# Patient Record
Sex: FEMALE | Race: WHITE | NOT HISPANIC OR LATINO | Employment: PART TIME | URBAN - METROPOLITAN AREA
[De-identification: names, ages, dates, MRNs, and addresses within clinical notes are randomized per-mention and may not be internally consistent; named-entity substitution may affect disease eponyms.]

---

## 2021-05-27 ENCOUNTER — RECORDS - HEALTHEAST (OUTPATIENT)
Dept: ADMINISTRATIVE | Facility: CLINIC | Age: 36
End: 2021-05-27

## 2024-04-07 ENCOUNTER — APPOINTMENT (OUTPATIENT)
Dept: CT IMAGING | Facility: CLINIC | Age: 39
End: 2024-04-07
Attending: STUDENT IN AN ORGANIZED HEALTH CARE EDUCATION/TRAINING PROGRAM
Payer: COMMERCIAL

## 2024-04-07 ENCOUNTER — HOSPITAL ENCOUNTER (EMERGENCY)
Facility: CLINIC | Age: 39
Discharge: HOME | End: 2024-04-07
Attending: STUDENT IN AN ORGANIZED HEALTH CARE EDUCATION/TRAINING PROGRAM | Admitting: STUDENT IN AN ORGANIZED HEALTH CARE EDUCATION/TRAINING PROGRAM
Payer: COMMERCIAL

## 2024-04-07 VITALS
SYSTOLIC BLOOD PRESSURE: 147 MMHG | TEMPERATURE: 98.8 F | HEART RATE: 94 BPM | OXYGEN SATURATION: 98 % | RESPIRATION RATE: 20 BRPM | DIASTOLIC BLOOD PRESSURE: 102 MMHG

## 2024-04-07 DIAGNOSIS — J01.20 ACUTE ETHMOIDAL SINUSITIS, RECURRENCE NOT SPECIFIED: ICD-10-CM

## 2024-04-07 LAB
ALBUMIN SERPL BCG-MCNC: 4 G/DL (ref 3.5–5.2)
ALP SERPL-CCNC: 79 U/L (ref 40–150)
ALT SERPL W P-5'-P-CCNC: 15 U/L (ref 0–50)
ANION GAP SERPL CALCULATED.3IONS-SCNC: 10 MMOL/L (ref 7–15)
AST SERPL W P-5'-P-CCNC: 18 U/L (ref 0–45)
BASOPHILS # BLD AUTO: 0 10E3/UL (ref 0–0.2)
BASOPHILS NFR BLD AUTO: 0 %
BILIRUB SERPL-MCNC: 0.2 MG/DL
BUN SERPL-MCNC: 9.8 MG/DL (ref 6–20)
CALCIUM SERPL-MCNC: 8.7 MG/DL (ref 8.6–10)
CHLORIDE SERPL-SCNC: 103 MMOL/L (ref 98–107)
CREAT SERPL-MCNC: 0.7 MG/DL (ref 0.51–0.95)
CRP SERPL-MCNC: 3.49 MG/L
DEPRECATED HCO3 PLAS-SCNC: 24 MMOL/L (ref 22–29)
EGFRCR SERPLBLD CKD-EPI 2021: >90 ML/MIN/1.73M2
EOSINOPHIL # BLD AUTO: 0.2 10E3/UL (ref 0–0.7)
EOSINOPHIL NFR BLD AUTO: 2 %
ERYTHROCYTE [DISTWIDTH] IN BLOOD BY AUTOMATED COUNT: 12.4 % (ref 10–15)
ERYTHROCYTE [SEDIMENTATION RATE] IN BLOOD BY WESTERGREN METHOD: 16 MM/HR (ref 0–20)
GLUCOSE SERPL-MCNC: 89 MG/DL (ref 70–99)
HCT VFR BLD AUTO: 40 % (ref 35–47)
HGB BLD-MCNC: 13.6 G/DL (ref 11.7–15.7)
HOLD SPECIMEN: NORMAL
IMM GRANULOCYTES # BLD: 0 10E3/UL
IMM GRANULOCYTES NFR BLD: 0 %
LYMPHOCYTES # BLD AUTO: 3.3 10E3/UL (ref 0.8–5.3)
LYMPHOCYTES NFR BLD AUTO: 34 %
MCH RBC QN AUTO: 29.2 PG (ref 26.5–33)
MCHC RBC AUTO-ENTMCNC: 34 G/DL (ref 31.5–36.5)
MCV RBC AUTO: 86 FL (ref 78–100)
MONOCYTES # BLD AUTO: 0.7 10E3/UL (ref 0–1.3)
MONOCYTES NFR BLD AUTO: 7 %
NEUTROPHILS # BLD AUTO: 5.6 10E3/UL (ref 1.6–8.3)
NEUTROPHILS NFR BLD AUTO: 57 %
NRBC # BLD AUTO: 0 10E3/UL
NRBC BLD AUTO-RTO: 0 /100
PLATELET # BLD AUTO: 240 10E3/UL (ref 150–450)
POTASSIUM SERPL-SCNC: 3.7 MMOL/L (ref 3.4–5.3)
PROT SERPL-MCNC: 7.3 G/DL (ref 6.4–8.3)
RBC # BLD AUTO: 4.65 10E6/UL (ref 3.8–5.2)
SODIUM SERPL-SCNC: 137 MMOL/L (ref 135–145)
WBC # BLD AUTO: 9.9 10E3/UL (ref 4–11)

## 2024-04-07 PROCEDURE — 36415 COLL VENOUS BLD VENIPUNCTURE: CPT | Performed by: STUDENT IN AN ORGANIZED HEALTH CARE EDUCATION/TRAINING PROGRAM

## 2024-04-07 PROCEDURE — 250N000011 HC RX IP 250 OP 636: Performed by: STUDENT IN AN ORGANIZED HEALTH CARE EDUCATION/TRAINING PROGRAM

## 2024-04-07 PROCEDURE — 70450 CT HEAD/BRAIN W/O DYE: CPT

## 2024-04-07 PROCEDURE — 85025 COMPLETE CBC W/AUTO DIFF WBC: CPT | Performed by: STUDENT IN AN ORGANIZED HEALTH CARE EDUCATION/TRAINING PROGRAM

## 2024-04-07 PROCEDURE — 99285 EMERGENCY DEPT VISIT HI MDM: CPT | Mod: 25

## 2024-04-07 PROCEDURE — 250N000009 HC RX 250: Performed by: STUDENT IN AN ORGANIZED HEALTH CARE EDUCATION/TRAINING PROGRAM

## 2024-04-07 PROCEDURE — 96361 HYDRATE IV INFUSION ADD-ON: CPT

## 2024-04-07 PROCEDURE — 258N000003 HC RX IP 258 OP 636: Performed by: STUDENT IN AN ORGANIZED HEALTH CARE EDUCATION/TRAINING PROGRAM

## 2024-04-07 PROCEDURE — 80053 COMPREHEN METABOLIC PANEL: CPT | Performed by: STUDENT IN AN ORGANIZED HEALTH CARE EDUCATION/TRAINING PROGRAM

## 2024-04-07 PROCEDURE — 99284 EMERGENCY DEPT VISIT MOD MDM: CPT | Performed by: STUDENT IN AN ORGANIZED HEALTH CARE EDUCATION/TRAINING PROGRAM

## 2024-04-07 PROCEDURE — 70481 CT ORBIT/EAR/FOSSA W/DYE: CPT | Mod: XU

## 2024-04-07 PROCEDURE — 86140 C-REACTIVE PROTEIN: CPT | Performed by: STUDENT IN AN ORGANIZED HEALTH CARE EDUCATION/TRAINING PROGRAM

## 2024-04-07 PROCEDURE — 85652 RBC SED RATE AUTOMATED: CPT | Performed by: STUDENT IN AN ORGANIZED HEALTH CARE EDUCATION/TRAINING PROGRAM

## 2024-04-07 PROCEDURE — 96374 THER/PROPH/DIAG INJ IV PUSH: CPT | Mod: 59

## 2024-04-07 PROCEDURE — 96375 TX/PRO/DX INJ NEW DRUG ADDON: CPT

## 2024-04-07 RX ORDER — DIPHENHYDRAMINE HYDROCHLORIDE 50 MG/ML
25 INJECTION INTRAMUSCULAR; INTRAVENOUS ONCE
Status: COMPLETED | OUTPATIENT
Start: 2024-04-07 | End: 2024-04-07

## 2024-04-07 RX ORDER — KETOROLAC TROMETHAMINE 15 MG/ML
15 INJECTION, SOLUTION INTRAMUSCULAR; INTRAVENOUS ONCE
Status: COMPLETED | OUTPATIENT
Start: 2024-04-07 | End: 2024-04-07

## 2024-04-07 RX ORDER — IOPAMIDOL 755 MG/ML
67 INJECTION, SOLUTION INTRAVASCULAR ONCE
Status: COMPLETED | OUTPATIENT
Start: 2024-04-07 | End: 2024-04-07

## 2024-04-07 RX ADMIN — IOPAMIDOL 67 ML: 755 INJECTION, SOLUTION INTRAVENOUS at 20:16

## 2024-04-07 RX ADMIN — SODIUM CHLORIDE 80 ML: 9 INJECTION, SOLUTION INTRAVENOUS at 20:15

## 2024-04-07 RX ADMIN — DIPHENHYDRAMINE HYDROCHLORIDE 25 MG: 50 INJECTION, SOLUTION INTRAMUSCULAR; INTRAVENOUS at 19:42

## 2024-04-07 RX ADMIN — SODIUM CHLORIDE 1000 ML: 9 INJECTION, SOLUTION INTRAVENOUS at 19:36

## 2024-04-07 RX ADMIN — KETOROLAC TROMETHAMINE 15 MG: 15 INJECTION, SOLUTION INTRAMUSCULAR; INTRAVENOUS at 19:36

## 2024-04-07 RX ADMIN — PROCHLORPERAZINE EDISYLATE 10 MG: 5 INJECTION INTRAMUSCULAR; INTRAVENOUS at 19:44

## 2024-04-07 ASSESSMENT — COLUMBIA-SUICIDE SEVERITY RATING SCALE - C-SSRS
1. IN THE PAST MONTH, HAVE YOU WISHED YOU WERE DEAD OR WISHED YOU COULD GO TO SLEEP AND NOT WAKE UP?: NO
6. HAVE YOU EVER DONE ANYTHING, STARTED TO DO ANYTHING, OR PREPARED TO DO ANYTHING TO END YOUR LIFE?: NO
2. HAVE YOU ACTUALLY HAD ANY THOUGHTS OF KILLING YOURSELF IN THE PAST MONTH?: NO

## 2024-04-07 ASSESSMENT — ACTIVITIES OF DAILY LIVING (ADL)
ADLS_ACUITY_SCORE: 35
ADLS_ACUITY_SCORE: 35
ADLS_ACUITY_SCORE: 33

## 2024-04-07 NOTE — ED TRIAGE NOTES
Patient comes in for HTN and headache for past three days. Pt states headache is worsening. No one sided weakness or vision changes noted. Pt last had ibuprofen 1500 with no relief.      Triage Assessment (Adult)       Row Name 04/07/24 8901          Triage Assessment    Airway WDL WDL        Respiratory WDL    Respiratory WDL WDL        Skin Circulation/Temperature WDL    Skin Circulation/Temperature WDL WDL        Cardiac WDL    Cardiac WDL WDL        Peripheral/Neurovascular WDL    Peripheral Neurovascular WDL WDL        Cognitive/Neuro/Behavioral WDL    Cognitive/Neuro/Behavioral WDL WDL

## 2024-04-08 NOTE — ED PROVIDER NOTES
History     Chief Complaint   Patient presents with    Hypertension     HPI  Keyla Garcia is a 38 year old female who presents to the department for evaluation of right-sided periorbital headache.  Patient explains that yesterday morning she developed achy discomfort around the right eye orbit which has progressed in severity since onset, also appreciates some swelling and edema of her eyelids.  Today she began to feel tenderness to palpation around the right eye orbit so eventually came to department for evaluation.  She denies history of similar symptoms in the past, no previous diagnosis of migraine or cluster headaches.  She denies changes in vision from baseline, nasal congestion, sore throat, neck pain or stiffness.  No other complaint.        Allergies:  No Known Allergies    Problem List:    There are no problems to display for this patient.       Past Medical History:    No past medical history on file.    Past Surgical History:    No past surgical history on file.    Family History:    No family history on file.    Social History:  Marital Status:   [2]        Medications:    amoxicillin-clavulanate (AUGMENTIN) 875-125 MG tablet          Review of Systems   ROS: 10 point ROS neg other than the symptoms noted above in the HPI.    Physical Exam   BP: (!) 165/115  Pulse: 89  Temp: 98.8  F (37.1  C)  Resp: 20  SpO2: 100 %      Physical Exam  Constitutional:  Well developed, well nourished.  Appears nontoxic but uncomfortable.   Head:  Atraumatic.  Mild left periorbital edema without warmth cellulitic appearance.  No palpable temporal arteries.  Eyes:  Conjunctivae are normal.  EOMI and denies diplopia.  PERRL.  Negative for nystagmus.   Oral:  Patient is without trismus.  Moist oral mucosa.  Normal dentition of remaining teeth without significant decay, fracture, or avulsion.  Gingival lining intact without abscess or ulcerative gingivitis.  No pharyngeal erythema or exudate.  No uvular asymmetry.   Patient is able to elevate tongue without sublingual swelling.  Voice is not muffled.  Tolerates secretions.  Neck:  Neck supple and without nuchal rigidity.  Cardiovascular:  No cyanosis.   Respiratory/Chest:  Effort normal, no respiratory distress.   Musculoskeletal:  Moves all extremities spontaneously and without complaint.  Neuro:  Patient is alert and oriented, ambulated without difficulty or ataxia.  CN II-XII grossly intact.   Skin:  Skin is warm and dry, not diaphoretic.  Psych:  Appears to have a normal mood and affect.    ED Course        Procedures                  Results for orders placed or performed during the hospital encounter of 04/07/24 (from the past 24 hour(s))   [place] Draw    Narrative    The following orders were created for panel order [place] Draw.  Procedure                               Abnormality         Status                     ---------                               -----------         ------                     Extra Blue Top Tube[[id number]]                              Final result               Extra Red Top Tube[[id number]]                               Final result               Extra Green Top (Lithium...[[id number]]                      Final result               Extra Purple Top Tube[[id number]]                            Final result                 Please view results for these tests on the individual orders.   Extra Blue Top Tube   Result Value Ref Range    Hold Specimen JIC    Extra Red Top Tube   Result Value Ref Range    Hold Specimen JIC    Extra Green Top (Lithium Heparin) Tube   Result Value Ref Range    Hold Specimen JIC    Extra Purple Top Tube   Result Value Ref Range    Hold Specimen JIC    CBC with platelets differential    Narrative    The following orders were created for panel order CBC with platelets differential.  Procedure                               Abnormality         Status                     ---------                               -----------          ------                     CBC with platelets and d...[[id number]]                      Final result                 Please view results for these tests on the individual orders.   Comprehensive metabolic panel   Result Value Ref Range    Sodium 137 135 - 145 mmol/L    Potassium 3.7 3.4 - 5.3 mmol/L    Carbon Dioxide (CO2) 24 22 - 29 mmol/L    Anion Gap 10 7 - 15 mmol/L    Urea Nitrogen 9.8 6.0 - 20.0 mg/dL    Creatinine 0.70 0.51 - 0.95 mg/dL    GFR Estimate >90 >60 mL/min/1.73m2    Calcium 8.7 8.6 - 10.0 mg/dL    Chloride 103 98 - 107 mmol/L    Glucose 89 70 - 99 mg/dL    Alkaline Phosphatase 79 40 - 150 U/L    AST 18 0 - 45 U/L    ALT 15 0 - 50 U/L    Protein Total 7.3 6.4 - 8.3 g/dL    Albumin 4.0 3.5 - 5.2 g/dL    Bilirubin Total 0.2 <=1.2 mg/dL   CRP inflammation   Result Value Ref Range    CRP Inflammation 3.49 <5.00 mg/L   Erythrocyte sedimentation rate auto   Result Value Ref Range    Erythrocyte Sedimentation Rate 16 0 - 20 mm/hr   CBC with platelets and differential   Result Value Ref Range    WBC Count 9.9 4.0 - 11.0 10e3/uL    RBC Count 4.65 3.80 - 5.20 10e6/uL    Hemoglobin 13.6 11.7 - 15.7 g/dL    Hematocrit 40.0 35.0 - 47.0 %    MCV 86 78 - 100 fL    MCH 29.2 26.5 - 33.0 pg    MCHC 34.0 31.5 - 36.5 g/dL    RDW 12.4 10.0 - 15.0 %    Platelet Count 240 150 - 450 10e3/uL    % Neutrophils 57 %    % Lymphocytes 34 %    % Monocytes 7 %    % Eosinophils 2 %    % Basophils 0 %    % Immature Granulocytes 0 %    NRBCs per 100 WBC 0 <1 /100    Absolute Neutrophils 5.6 1.6 - 8.3 10e3/uL    Absolute Lymphocytes 3.3 0.8 - 5.3 10e3/uL    Absolute Monocytes 0.7 0.0 - 1.3 10e3/uL    Absolute Eosinophils 0.2 0.0 - 0.7 10e3/uL    Absolute Basophils 0.0 0.0 - 0.2 10e3/uL    Absolute Immature Granulocytes 0.0 <=0.4 10e3/uL    Absolute NRBCs 0.0 10e3/uL   Head CT w/o contrast    Narrative    EXAM: CT HEAD W/O CONTRAST  LOCATION: [facility]  DATE: 4/7/2024    INDICATION: Right sided periorbital  headache.  COMPARISON: None.  TECHNIQUE: Routine CT Head without IV contrast. Multiplanar reformats. Dose reduction techniques were used.    FINDINGS:  INTRACRANIAL CONTENTS: No intracranial hemorrhage, extraaxial collection, or mass effect.  No CT evidence of acute infarct. Normal parenchymal attenuation. Normal ventricles and sulci.     VISUALIZED ORBITS/SINUSES/MASTOIDS: No intraorbital abnormality. Partial opacification right ethmoidal air cells. No middle ear or mastoid effusion.    BONES/SOFT TISSUES: No acute abnormality.      Impression    IMPRESSION:  1.  No acute intracranial process.   CT Orbits w Contrast    Narrative    EXAM: CT ORBITS W CONTRAST  LOCATION: [facility]  DATE: 4/7/2024    INDICATION: Right sided periorbital headache with tenderness.  COMPARISON: None.  CONTRAST: 67 Isovue 370  TECHNIQUE: Routine with IV contrast. Multiplanar reformats. Dose reduction techniques were used.     FINDINGS:   RIGHT ORBIT: Normal periorbital soft tissues, bony orbit, globe, extraocular muscles, optic nerve/sheath, periorbital fat and lacrimal gland.     LEFT ORBIT: Normal periorbital soft tissues, bony orbit, globe, extraocular muscles, optic nerve/sheath, periorbital fat and lacrimal gland.    SINUSES: Partial opacification of right ethmoidal air cells.    VISUALIZED INTRACRANIAL CONTENTS: No abnormality.       Impression    IMPRESSION:   1.  Normal appearance of perioperative soft tissues and intraorbital structures bilaterally.  2.  Partial opacification right ethmoidal air cells. Please correlate clinically for acute sinusitis.       Medications   prochlorperazine (COMPAZINE) injection 10 mg (10 mg Intravenous $Given 4/7/24 1944)   diphenhydrAMINE (BENADRYL) injection 25 mg (25 mg Intravenous $Given 4/7/24 1942)   sodium chloride 0.9% BOLUS 1,000 mL (1,000 mLs Intravenous $New Bag 4/7/24 1936)   ketorolac (TORADOL) injection 15 mg (15 mg Intravenous $Given 4/7/24 1936)  "  iopamidol (ISOVUE-370) solution 67 mL (67 mLs Intravenous $Given 4/7/24 2016)   sodium chloride 0.9 % bag 500mL for CT scan flush use (80 mLs Intravenous $Given 4/7/24 2015)       Assessments & Plan (with Medical Decision Making)   Keyla Garcia is a 38 year old female who presents to the department for evaluation of right-sided periorbital headache and mild edema.  She arrived to department afebrile and hemodynamically stable without neck pain/stiffness, recent trauma, or neurologic deficits.  Lab work including inflammatory markers are reassuring.  CT scan of head/brain and orbit reviewed, radiologist reads \"opacification right ethmoid all air cells... please correlate clinically for acute sinusitis\".  Clinically this could be contributing to patient's symptoms but periorbital pain with tenderness, no obvious cellulitis.  She was started on oral antibiotic Augmentin and recommend OTC analgesic medications as directed.        Disclaimer:  This note consists of symbols derived from keyboarding, dictation, and/or voice recognition software.  As a result, there may be errors in the script that have gone undetected.  Please consider this when interpreting information found in the chart.      I have reviewed the nursing notes.    I have reviewed the findings, diagnosis, plan and need for follow up with the patient.          New Prescriptions    AMOXICILLIN-CLAVULANATE (AUGMENTIN) 875-125 MG TABLET    Take 1 tablet by mouth 2 times daily       Final diagnoses:   Acute ethmoidal sinusitis, recurrence not specified       4/7/2024   [facility] EMERGENCY DEPT       Corky Song DO  04/07/24 2103    "

## 2024-04-08 NOTE — ED NOTES
Pt reports headache around her R eye that started yesterday morning and has progressively gotten worse. Denies vision changes, balance issues.

## 2024-04-09 ENCOUNTER — TELEPHONE (OUTPATIENT)
Dept: OPHTHALMOLOGY | Facility: CLINIC | Age: 39
End: 2024-04-09

## 2024-04-09 ENCOUNTER — HOSPITAL ENCOUNTER (EMERGENCY)
Facility: CLINIC | Age: 39
Discharge: HOME | End: 2024-04-09
Attending: FAMILY MEDICINE | Admitting: FAMILY MEDICINE
Payer: COMMERCIAL

## 2024-04-09 VITALS
TEMPERATURE: 98.4 F | SYSTOLIC BLOOD PRESSURE: 152 MMHG | OXYGEN SATURATION: 100 % | HEART RATE: 95 BPM | DIASTOLIC BLOOD PRESSURE: 107 MMHG | RESPIRATION RATE: 14 BRPM

## 2024-04-09 DIAGNOSIS — H01.001 BLEPHARITIS OF RIGHT UPPER EYELID, UNSPECIFIED TYPE: ICD-10-CM

## 2024-04-09 DIAGNOSIS — J01.20 SUBACUTE ETHMOIDAL SINUSITIS: ICD-10-CM

## 2024-04-09 DIAGNOSIS — R51.9 RIGHT-SIDED HEADACHE: ICD-10-CM

## 2024-04-09 DIAGNOSIS — H16.041 MARGINAL KERATITIS OF RIGHT EYE DUE TO STAPHYLOCOCCUS TOXIN: ICD-10-CM

## 2024-04-09 DIAGNOSIS — R11.2 NAUSEA AND VOMITING, UNSPECIFIED VOMITING TYPE: ICD-10-CM

## 2024-04-09 DIAGNOSIS — B95.8 MARGINAL KERATITIS OF RIGHT EYE DUE TO STAPHYLOCOCCUS TOXIN: ICD-10-CM

## 2024-04-09 DIAGNOSIS — H04.123 DRY EYES: ICD-10-CM

## 2024-04-09 LAB
ALBUMIN SERPL BCG-MCNC: 3.9 G/DL (ref 3.5–5.2)
ALP SERPL-CCNC: 63 U/L (ref 40–150)
ALT SERPL W P-5'-P-CCNC: 11 U/L (ref 0–50)
ANION GAP SERPL CALCULATED.3IONS-SCNC: 11 MMOL/L (ref 7–15)
APTT PPP: 23 SECONDS (ref 22–38)
AST SERPL W P-5'-P-CCNC: 16 U/L (ref 0–45)
BASOPHILS # BLD AUTO: 0 10E3/UL (ref 0–0.2)
BASOPHILS NFR BLD AUTO: 0 %
BILIRUB SERPL-MCNC: 0.2 MG/DL
BUN SERPL-MCNC: 5.2 MG/DL (ref 6–20)
CALCIUM SERPL-MCNC: 9 MG/DL (ref 8.6–10)
CHLORIDE SERPL-SCNC: 105 MMOL/L (ref 98–107)
CREAT SERPL-MCNC: 0.7 MG/DL (ref 0.51–0.95)
CRP SERPL-MCNC: <3 MG/L
DEPRECATED HCO3 PLAS-SCNC: 21 MMOL/L (ref 22–29)
EGFRCR SERPLBLD CKD-EPI 2021: >90 ML/MIN/1.73M2
EOSINOPHIL # BLD AUTO: 0.1 10E3/UL (ref 0–0.7)
EOSINOPHIL NFR BLD AUTO: 1 %
ERYTHROCYTE [DISTWIDTH] IN BLOOD BY AUTOMATED COUNT: 13 % (ref 10–15)
ERYTHROCYTE [SEDIMENTATION RATE] IN BLOOD BY WESTERGREN METHOD: 17 MM/HR (ref 0–20)
GLUCOSE SERPL-MCNC: 83 MG/DL (ref 70–99)
HCG SERPL QL: NEGATIVE
HCT VFR BLD AUTO: 39.9 % (ref 35–47)
HGB BLD-MCNC: 13.4 G/DL (ref 11.7–15.7)
IMM GRANULOCYTES # BLD: 0 10E3/UL
IMM GRANULOCYTES NFR BLD: 0 %
INR PPP: 1.04 (ref 0.85–1.15)
LYMPHOCYTES # BLD AUTO: 1.9 10E3/UL (ref 0.8–5.3)
LYMPHOCYTES NFR BLD AUTO: 23 %
MAGNESIUM SERPL-MCNC: 2 MG/DL (ref 1.7–2.3)
MCH RBC QN AUTO: 29.3 PG (ref 26.5–33)
MCHC RBC AUTO-ENTMCNC: 33.6 G/DL (ref 31.5–36.5)
MCV RBC AUTO: 87 FL (ref 78–100)
MONOCYTES # BLD AUTO: 0.6 10E3/UL (ref 0–1.3)
MONOCYTES NFR BLD AUTO: 7 %
NEUTROPHILS # BLD AUTO: 5.9 10E3/UL (ref 1.6–8.3)
NEUTROPHILS NFR BLD AUTO: 69 %
NRBC # BLD AUTO: 0 10E3/UL
NRBC BLD AUTO-RTO: 0 /100
PLATELET # BLD AUTO: 226 10E3/UL (ref 150–450)
POTASSIUM SERPL-SCNC: 3.8 MMOL/L (ref 3.4–5.3)
PROT SERPL-MCNC: 7.2 G/DL (ref 6.4–8.3)
RBC # BLD AUTO: 4.58 10E6/UL (ref 3.8–5.2)
SODIUM SERPL-SCNC: 137 MMOL/L (ref 135–145)
TSH SERPL DL<=0.005 MIU/L-ACNC: 6.68 UIU/ML (ref 0.3–4.2)
WBC # BLD AUTO: 8.6 10E3/UL (ref 4–11)

## 2024-04-09 PROCEDURE — 84443 ASSAY THYROID STIM HORMONE: CPT | Performed by: FAMILY MEDICINE

## 2024-04-09 PROCEDURE — 96361 HYDRATE IV INFUSION ADD-ON: CPT | Performed by: FAMILY MEDICINE

## 2024-04-09 PROCEDURE — 99285 EMERGENCY DEPT VISIT HI MDM: CPT | Performed by: FAMILY MEDICINE

## 2024-04-09 PROCEDURE — 99284 EMERGENCY DEPT VISIT MOD MDM: CPT | Mod: 25 | Performed by: FAMILY MEDICINE

## 2024-04-09 PROCEDURE — 85652 RBC SED RATE AUTOMATED: CPT | Performed by: FAMILY MEDICINE

## 2024-04-09 PROCEDURE — 85025 COMPLETE CBC W/AUTO DIFF WBC: CPT | Performed by: FAMILY MEDICINE

## 2024-04-09 PROCEDURE — 85610 PROTHROMBIN TIME: CPT | Performed by: FAMILY MEDICINE

## 2024-04-09 PROCEDURE — 36415 COLL VENOUS BLD VENIPUNCTURE: CPT | Performed by: FAMILY MEDICINE

## 2024-04-09 PROCEDURE — 80053 COMPREHEN METABOLIC PANEL: CPT | Performed by: FAMILY MEDICINE

## 2024-04-09 PROCEDURE — 85730 THROMBOPLASTIN TIME PARTIAL: CPT | Performed by: FAMILY MEDICINE

## 2024-04-09 PROCEDURE — 250N000011 HC RX IP 250 OP 636: Performed by: FAMILY MEDICINE

## 2024-04-09 PROCEDURE — 99207 PR NO BILLABLE SERVICE THIS VISIT: CPT | Performed by: STUDENT IN AN ORGANIZED HEALTH CARE EDUCATION/TRAINING PROGRAM

## 2024-04-09 PROCEDURE — 250N000009 HC RX 250: Performed by: FAMILY MEDICINE

## 2024-04-09 PROCEDURE — 83735 ASSAY OF MAGNESIUM: CPT | Performed by: FAMILY MEDICINE

## 2024-04-09 PROCEDURE — 96374 THER/PROPH/DIAG INJ IV PUSH: CPT | Performed by: FAMILY MEDICINE

## 2024-04-09 PROCEDURE — 258N000003 HC RX IP 258 OP 636: Performed by: FAMILY MEDICINE

## 2024-04-09 PROCEDURE — 86140 C-REACTIVE PROTEIN: CPT | Performed by: FAMILY MEDICINE

## 2024-04-09 PROCEDURE — 96375 TX/PRO/DX INJ NEW DRUG ADDON: CPT | Performed by: FAMILY MEDICINE

## 2024-04-09 PROCEDURE — 84703 CHORIONIC GONADOTROPIN ASSAY: CPT | Performed by: FAMILY MEDICINE

## 2024-04-09 RX ORDER — HYDROMORPHONE HCL IN WATER/PF 6 MG/30 ML
0.2 PATIENT CONTROLLED ANALGESIA SYRINGE INTRAVENOUS
Status: DISCONTINUED | OUTPATIENT
Start: 2024-04-09 | End: 2024-04-09 | Stop reason: HOSPADM

## 2024-04-09 RX ORDER — DIPHENHYDRAMINE HYDROCHLORIDE 50 MG/ML
25 INJECTION INTRAMUSCULAR; INTRAVENOUS ONCE
Status: COMPLETED | OUTPATIENT
Start: 2024-04-09 | End: 2024-04-09

## 2024-04-09 RX ORDER — METOCLOPRAMIDE HYDROCHLORIDE 5 MG/ML
5 INJECTION INTRAMUSCULAR; INTRAVENOUS ONCE
Status: COMPLETED | OUTPATIENT
Start: 2024-04-09 | End: 2024-04-09

## 2024-04-09 RX ORDER — KETOROLAC TROMETHAMINE 15 MG/ML
15 INJECTION, SOLUTION INTRAMUSCULAR; INTRAVENOUS ONCE
Status: COMPLETED | OUTPATIENT
Start: 2024-04-09 | End: 2024-04-09

## 2024-04-09 RX ORDER — OXYCODONE HYDROCHLORIDE 5 MG/1
5 TABLET ORAL EVERY 6 HOURS PRN
Qty: 12 TABLET | Refills: 0 | Status: SHIPPED | OUTPATIENT
Start: 2024-04-09 | End: 2024-04-10

## 2024-04-09 RX ORDER — LIDOCAINE 40 MG/G
CREAM TOPICAL
Status: DISCONTINUED | OUTPATIENT
Start: 2024-04-09 | End: 2024-04-09 | Stop reason: HOSPADM

## 2024-04-09 RX ORDER — NEOMYCIN POLYMYXIN B SULFATES AND DEXAMETHASONE 3.5; 10000; 1 MG/ML; [USP'U]/ML; MG/ML
SUSPENSION/ DROPS OPHTHALMIC
Qty: 5 ML | Refills: 0 | Status: SHIPPED | OUTPATIENT
Start: 2024-04-09 | End: 2024-04-12

## 2024-04-09 RX ORDER — POLYVINYL ALCOHOL 14 MG/ML
1 SOLUTION/ DROPS OPHTHALMIC 4 TIMES DAILY
Qty: 15 ML | Refills: 1 | Status: SHIPPED | OUTPATIENT
Start: 2024-04-09 | End: 2024-04-25

## 2024-04-09 RX ORDER — ONDANSETRON 4 MG/1
4 TABLET, ORALLY DISINTEGRATING ORAL EVERY 8 HOURS PRN
Qty: 10 TABLET | Refills: 0 | Status: SHIPPED | OUTPATIENT
Start: 2024-04-09 | End: 2024-04-12

## 2024-04-09 RX ORDER — ONDANSETRON 2 MG/ML
4 INJECTION INTRAMUSCULAR; INTRAVENOUS EVERY 30 MIN PRN
Status: DISCONTINUED | OUTPATIENT
Start: 2024-04-09 | End: 2024-04-09 | Stop reason: HOSPADM

## 2024-04-09 RX ORDER — PROCHLORPERAZINE MALEATE 10 MG
10 TABLET ORAL EVERY 6 HOURS PRN
Qty: 15 TABLET | Refills: 0 | Status: SHIPPED | OUTPATIENT
Start: 2024-04-09 | End: 2024-04-19

## 2024-04-09 RX ORDER — PROPARACAINE HYDROCHLORIDE 5 MG/ML
2 SOLUTION/ DROPS OPHTHALMIC ONCE
Status: COMPLETED | OUTPATIENT
Start: 2024-04-09 | End: 2024-04-09

## 2024-04-09 RX ADMIN — DIPHENHYDRAMINE HYDROCHLORIDE 25 MG: 50 INJECTION, SOLUTION INTRAMUSCULAR; INTRAVENOUS at 14:01

## 2024-04-09 RX ADMIN — KETOROLAC TROMETHAMINE 15 MG: 15 INJECTION, SOLUTION INTRAMUSCULAR; INTRAVENOUS at 14:01

## 2024-04-09 RX ADMIN — HYDROMORPHONE HYDROCHLORIDE 0.2 MG: 0.2 INJECTION, SOLUTION INTRAMUSCULAR; INTRAVENOUS; SUBCUTANEOUS at 17:33

## 2024-04-09 RX ADMIN — SODIUM CHLORIDE 1000 ML: 9 INJECTION, SOLUTION INTRAVENOUS at 13:59

## 2024-04-09 RX ADMIN — METOCLOPRAMIDE 5 MG: 5 INJECTION, SOLUTION INTRAMUSCULAR; INTRAVENOUS at 14:01

## 2024-04-09 RX ADMIN — PROPARACAINE HYDROCHLORIDE 2 DROP: 5 SOLUTION/ DROPS OPHTHALMIC at 13:59

## 2024-04-09 RX ADMIN — SODIUM CHLORIDE 1000 ML: 9 INJECTION, SOLUTION INTRAVENOUS at 14:06

## 2024-04-09 ASSESSMENT — ACTIVITIES OF DAILY LIVING (ADL)
ADLS_ACUITY_SCORE: 35

## 2024-04-09 NOTE — PROGRESS NOTES
Patient ambulatory on discharge with family and friends. Hard copy scripts given as applicable. All discharge reviewed with the patient including follow up with primary care and if needed prescription refill with primary care team. Patient departed alert and responsive. PIV removed. Patient verbalized understanding.

## 2024-04-09 NOTE — ED PROVIDER NOTES
ED Provider Note  [facility]      History     Chief Complaint   Patient presents with    Headache     HPI  Keyla Garcia is a 38 year old female who presents emergency room with ongoing headache now with nausea vomiting.  Patient denies history of headaches her  is a paramedic.  Patient noted 2 days ago was seen at [facility] emergency department for evaluation of these acute headaches it seems she describes as more right periorbital and eye pain some increasing injection and tearing noted 2.  Patient had a CT of the orbits and head showed maybe a right ethmoid sinusitis acute treated with Augmentin patient went home continued headaches not improved with treatment nausea vomiting presents here now by ambulance given a headache cocktail via the paramedics prior to arrival along with antiemetics patient's nausea is much better the headache is more improved able to open the right eye better.  Patient denies history of any cluster headaches migraine headaches no family history otherwise denies any neurochanges.  No fevers chills neck stiffness.  No visual changes otherwise no photophobia or hyperacusis etc. no tinnitus vertigo etc.  Now presents here for evaluation.  Present as her  also does offer information.    Past Medical History  No past medical history on file.  No past surgical history on file.  neomycin-polymixin-dexAMETHasone (MAXITROL) 0.1 % ophthalmic suspension  ondansetron (ZOFRAN ODT) 4 MG ODT tab  oxyCODONE (ROXICODONE) 5 MG tablet  polyvinyl alcohol (LIQUIFILM TEARS) 1.4 % ophthalmic solution  prochlorperazine (COMPAZINE) 10 MG tablet  amoxicillin-clavulanate (AUGMENTIN) 875-125 MG tablet      No Known Allergies  Family History  No family history on file.  Social History          A medically appropriate review of systems was performed with pertinent positives and negatives noted in the HPI, and all other systems negative.    Physical Exam   BP: (!)  152/107  Pulse: 95  Temp: 98.4  F (36.9  C)  Resp: 14  SpO2: 100 %  Physical Exam  Vitals and nursing note reviewed.   Constitutional:       General: She is in acute distress.      Appearance: Normal appearance. She is well-developed. She is not toxic-appearing.      Comments: Patient alert x 3  present also.  Patient with some right eye injection noted but no proptosis or ptosis or marked periorbital swelling no rashes seen slight increase in tearing of the right   HENT:      Head: Normocephalic and atraumatic.      Nose: No congestion or rhinorrhea.      Mouth/Throat:      Mouth: Mucous membranes are moist.      Pharynx: Oropharynx is clear.   Eyes:      General: No scleral icterus.     Extraocular Movements: Extraocular movements intact.      Pupils: Pupils are equal, round, and reactive to light.      Comments: Right eye conjunctival injection   Cardiovascular:      Rate and Rhythm: Normal rate and regular rhythm.   Pulmonary:      Effort: Pulmonary effort is normal. No respiratory distress.      Breath sounds: No stridor.   Abdominal:      General: Abdomen is flat.      Tenderness: There is no guarding.   Musculoskeletal:         General: No swelling or tenderness.      Cervical back: Normal range of motion and neck supple. No rigidity or tenderness.   Skin:     General: Skin is warm and dry.      Capillary Refill: Capillary refill takes less than 2 seconds.      Coloration: Skin is not jaundiced or pale.      Findings: No rash.   Neurological:      General: No focal deficit present.      Mental Status: She is alert and oriented to person, place, and time. Mental status is at baseline.   Psychiatric:      Comments: Flat but appropriate           ED Course, Procedures, & Data      Records are in [facility].  Imaging labs records noted from 7 April by Dr. Song with medications etc.  Other past history not significant this point.    In the ER reviewed paramedics history also medications given etc.  Patient  assessed mildly hypertensive somewhat improving neurologically intact otherwise patient feels better soft flat affect noted is not actively vomiting.  Is not writhing in pain.    IV been established we will give a liter of fluid also will place on high flow to treating potential cluster type etiology.  I talked to neurology they agreed at this point.  Will check labs inflammatory markers will consider checking her pressure in the right eye also making sure this is anything to do with glaucoma.  Patient reassessed at this point neurology did not have any other imaging recommendations currently at this point with the recent CT and orbits only showing a right ethmoid sinusitis but may still be the cause.    Patient here in the ER headache seem to be worsening with oxygen we did give a dose of Reglan Benadryl Toradol IV which is helping.  No neurochanges still with a tearing in the right eye with some conjunctival injection a little bit of lid edema in the ER we used Alcaine drops did not make any difference with the pain I used fluorescein strip and then a [place] lamp did not see any uptake checked her pressure was around 25 and that right I did not check the left eye.  Will discuss with Optho regarding if any concern regarding this.  The oxygen tank is run out currently at this point patient would like to hold off of the oxygen currently as it did not make any difference.    Labs reviewed white count 8.6 hemoglobin 13.4.  Sed rate 17 TSH 6.68.  Sodium 137 potassium 3.8.  Bicarb 21 gap 11 BUN is 5.2 creatinine is 0.7 glucose is 83 CRP is negative.  INR 1.04.  Pregnancy test negative.    Patient given Reglan Toradol and Benadryl along with ongoing fluids headache that was resolved.  I did check patient's pressure right eye with Alcaine drops also fluorescein staining prior to this did not show any obvious uptake suspect will do them.  Unable to evaluate with slit lamp currently.  Intraocular pressure was 24.  I discussed  with consult ophthalmology who did see the patient pressures are fine today no findings may be more that of a staph marginal keratitis along with some blepharitis and dry eyes with recommendations and follow-up.    Patient had recurrent headache again did receive some Dilaudid 0.2 mg IV for pain control consulted neurology is going to see the patient for reassessment.    Neurology did see the patient in the ER.  Patient's headache somewhat improved.  With her story they evaluated ophthalmology's findings with with keratitis etc. and has follow-up along with the ethmoid sinusitis would recommend discharging home and following up with ophthalmology this week as planned also patient given primary care clinics to follow-up with also as needs primary care physician also neurology did not feel at this point patient need to follow-up with them.  This point continue our plan    Patient feeling better discussed with  also is comfortable going home with the eyedrops Compazine will add some Zofran ODT and limited supply of oxycodone if needed.  If any worsening changes or concerns should return.  Patient  who is paramedic does understand agrees patient feels better and like to go home.        Procedures              Results for orders placed or performed during the hospital encounter of 04/09/24   Erythrocyte sedimentation rate auto     Status: Normal   Result Value Ref Range    Erythrocyte Sedimentation Rate 17 0 - 20 mm/hr   CRP inflammation     Status: Normal   Result Value Ref Range    CRP Inflammation <3.00 <5.00 mg/L   INR     Status: Normal   Result Value Ref Range    INR 1.04 0.85 - 1.15   Partial thromboplastin time     Status: Normal   Result Value Ref Range    aPTT 23 22 - 38 Seconds   Comprehensive metabolic panel     Status: Abnormal   Result Value Ref Range    Sodium 137 135 - 145 mmol/L    Potassium 3.8 3.4 - 5.3 mmol/L    Carbon Dioxide (CO2) 21 (L) 22 - 29 mmol/L    Anion Gap 11 7 - 15 mmol/L     Urea Nitrogen 5.2 (L) 6.0 - 20.0 mg/dL    Creatinine 0.70 0.51 - 0.95 mg/dL    GFR Estimate >90 >60 mL/min/1.73m2    Calcium 9.0 8.6 - 10.0 mg/dL    Chloride 105 98 - 107 mmol/L    Glucose 83 70 - 99 mg/dL    Alkaline Phosphatase 63 40 - 150 U/L    AST 16 0 - 45 U/L    ALT 11 0 - 50 U/L    Protein Total 7.2 6.4 - 8.3 g/dL    Albumin 3.9 3.5 - 5.2 g/dL    Bilirubin Total 0.2 <=1.2 mg/dL   Magnesium     Status: Normal   Result Value Ref Range    Magnesium 2.0 1.7 - 2.3 mg/dL   TSH     Status: Abnormal   Result Value Ref Range    TSH 6.68 (H) 0.30 - 4.20 uIU/mL   HCG qualitative Blood     Status: Normal   Result Value Ref Range    hCG Serum Qualitative Negative Negative   CBC with platelets and differential     Status: None   Result Value Ref Range    WBC Count 8.6 4.0 - 11.0 10e3/uL    RBC Count 4.58 3.80 - 5.20 10e6/uL    Hemoglobin 13.4 11.7 - 15.7 g/dL    Hematocrit 39.9 35.0 - 47.0 %    MCV 87 78 - 100 fL    MCH 29.3 26.5 - 33.0 pg    MCHC 33.6 31.5 - 36.5 g/dL    RDW 13.0 10.0 - 15.0 %    Platelet Count 226 150 - 450 10e3/uL    % Neutrophils 69 %    % Lymphocytes 23 %    % Monocytes 7 %    % Eosinophils 1 %    % Basophils 0 %    % Immature Granulocytes 0 %    NRBCs per 100 WBC 0 <1 /100    Absolute Neutrophils 5.9 1.6 - 8.3 10e3/uL    Absolute Lymphocytes 1.9 0.8 - 5.3 10e3/uL    Absolute Monocytes 0.6 0.0 - 1.3 10e3/uL    Absolute Eosinophils 0.1 0.0 - 0.7 10e3/uL    Absolute Basophils 0.0 0.0 - 0.2 10e3/uL    Absolute Immature Granulocytes 0.0 <=0.4 10e3/uL    Absolute NRBCs 0.0 10e3/uL   CBC with platelets differential     Status: None    Narrative    The following orders were created for panel order CBC with platelets differential.  Procedure                               Abnormality         Status                     ---------                               -----------         ------                     CBC with platelets and d...[[id number]]                      Final result                 Please view  results for these tests on the individual orders.     Medications   sodium chloride 0.9% BOLUS 1,000 mL (0 mLs Intravenous Stopped 4/9/24 1435)   metoclopramide (REGLAN) injection 5 mg (5 mg Intravenous $Given 4/9/24 1401)   diphenhydrAMINE (BENADRYL) injection 25 mg (25 mg Intravenous $Given 4/9/24 1401)   ketorolac (TORADOL) injection 15 mg (15 mg Intravenous $Given 4/9/24 1401)   proparacaine (ALCAINE) 0.5 % ophthalmic solution 2 drop (2 drops Right Eye $Given 4/9/24 1359)   sodium chloride 0.9% BOLUS 1,000 mL (0 mLs Intravenous Stopped 4/9/24 1633)     Labs Ordered and Resulted from Time of ED Arrival to Time of ED Departure   COMPREHENSIVE METABOLIC PANEL - Abnormal       Result Value    Sodium 137      Potassium 3.8      Carbon Dioxide (CO2) 21 (*)     Anion Gap 11      Urea Nitrogen 5.2 (*)     Creatinine 0.70      GFR Estimate >90      Calcium 9.0      Chloride 105      Glucose 83      Alkaline Phosphatase 63      AST 16      ALT 11      Protein Total 7.2      Albumin 3.9      Bilirubin Total 0.2     TSH - Abnormal    TSH 6.68 (*)    ERYTHROCYTE SEDIMENTATION RATE AUTO - Normal    Erythrocyte Sedimentation Rate 17     CRP INFLAMMATION - Normal    CRP Inflammation <3.00     INR - Normal    INR 1.04     PARTIAL THROMBOPLASTIN TIME - Normal    aPTT 23     MAGNESIUM - Normal    Magnesium 2.0     HCG QUALITATIVE PREGNANCY - Normal    hCG Serum Qualitative Negative     CBC WITH PLATELETS AND DIFFERENTIAL    WBC Count 8.6      RBC Count 4.58      Hemoglobin 13.4      Hematocrit 39.9      MCV 87      MCH 29.3      MCHC 33.6      RDW 13.0      Platelet Count 226      % Neutrophils 69      % Lymphocytes 23      % Monocytes 7      % Eosinophils 1      % Basophils 0      % Immature Granulocytes 0      NRBCs per 100 WBC 0      Absolute Neutrophils 5.9      Absolute Lymphocytes 1.9      Absolute Monocytes 0.6      Absolute Eosinophils 0.1      Absolute Basophils 0.0      Absolute Immature Granulocytes 0.0      Absolute  NRBCs 0.0       No orders to display          Critical care was not performed.     Medical Decision Making  The patient's presentation was of high complexity (an acute health issue posing potential threat to life or bodily function).    The patient's evaluation involved:  an assessment requiring an independent historian (see separate area of note for details)  review of external note(s) from 3+ sources (see separate area of note for details)  review of 3+ test result(s) ordered prior to this encounter (see separate area of note for details)  ordering and/or review of 3+ test(s) in this encounter (see separate area of note for details)  discussion of management or test interpretation with another health professional (see separate area of note for details)    The patient's management necessitated high risk (a parenteral controlled substance) and high risk (a decision regarding hospitalization).    Assessment & Plan   38-year-old female who presents to the ER with ongoing intractable right ocular type headache with associated nausea vomiting who presented by ambulance now.  Patient denies history of cluster headaches denies history of migraines etc.  Patient did not have any URI symptoms was seen last 2 days for headache was evaluated outside ER they did a CT scan without contrast of the head and orbits findings were that of right ethmoid sinusitis no other acute findings noted.  Patient started on Augmentin continues to have headache now more severe intractable nausea vomiting presented by ambulance did receive medications and routes it helped slightly but still having ongoing headache.  Patient noted right eye tearing also with injection and some lid swelling also.  No history glaucoma otherwise no other trauma no herpes rash or anything such as that.  Patient valuated here in the ER labs drawn reviewed also reviewed the images of previous CTs etc. discussed with neurology patient placed on high flow O2 initially did  not really help treating cluster headaches.  Patient given a dose of Toradol along with Benadryl and Reglan headache improved patient felt better I talked ophthalmology as I did eye pressures also slightly ophthalmology saw their evaluation felt this was a marginal staph keratitis along with dry eyes and blepharitis they recommended drops etc. and follow-up this week in clinic.  Patient was comfortable going home but then had recurrent headache I did give low dose of Dilaudid IV.  Patient felt somewhat improved seen by neurology they consulted here in the ER this point feel continue plan with the Augmentin along with the eyedrops and treat with Tylenol ibuprofen Compazine Zofran and other pain medications as needed to then follow-up with ophthalmology and primary physician no need to follow-up with them.  Patient displays comfortable with plan patient discharged with her  who is a paramedic also.  Return if any worsening symptoms.       I have reviewed the nursing notes. I have reviewed the findings, diagnosis, plan and need for follow up with the patient.    Discharge Medication List as of 4/9/2024  6:38 PM        START taking these medications    Details   neomycin-polymixin-dexAMETHasone (MAXITROL) 0.1 % ophthalmic suspension 2 drops right eye qid., Disp-5 mL, R-0, Local Print      ondansetron (ZOFRAN ODT) 4 MG ODT tab Take 1 tablet (4 mg) by mouth every 8 hours as needed, Disp-10 tablet, R-0, Local Print      oxyCODONE (ROXICODONE) 5 MG tablet Take 1 tablet (5 mg) by mouth every 6 hours as needed for moderate to severe pain, Disp-12 tablet, R-0, Local Print      polyvinyl alcohol (LIQUIFILM TEARS) 1.4 % ophthalmic solution Place 1 drop into both eyes 4 times daily, Disp-15 mL, R-1, Local Print      prochlorperazine (COMPAZINE) 10 MG tablet Take 1 tablet (10 mg) by mouth every 6 hours as needed for nausea, vomiting or other (headache), Disp-15 tablet, R-0, Local Print             Final diagnoses:    Right-sided headache   Nausea and vomiting, unspecified vomiting type   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 Inadvertent typographical errors may still exist.  Cheng Guerra MD.  Patient evaluated in the emergency department during the COVID-19 pandemic period. Careful attention to patients safety was addressed throughout the evaluation. Evaluation and treatment management was initiated with disposition made efficiently and appropriate as possible to minimize any risk of potential exposure to patient during this evaluation.       Cheng Guerra MD  04/09/24 [id number]

## 2024-04-09 NOTE — TELEPHONE ENCOUNTER
Please schedule this patient to be seen in the acute clinic on Friday or Monday.     Surface check only, no imaging.     --    Above per on call eye provider.    Note to triage/patient communicator to reach out tomorrow in my absence to assist in scheduling options.    Glen Thurston RN 5:23 PM 04/09/24

## 2024-04-09 NOTE — DISCHARGE INSTRUCTIONS
Home.  You were seen by ophthalmology and also neurology.  Ophthalmology recommended eye drops and follow up with them.   Preservative free artificial tears four times per day, BOTH eyes  - Maxitrol drops four times per day, RIGHT eye  - Lid hygiene: wash eyelids with Ocusoft lid scrub, dilute baby shampoo, or a gentle foaming cleanser before bed at night, BOTH eyes  - Use warm compresses three times per day over BOTH eyes for 5-10 minutes    - Follow up in clinic Friday or Monday (message sent to schedulers)  - Please provide the following information to the patient with regard to clinic follow up:      [facility], [address]th [address]  [address] [place] [address] [phone]  Compazine for headaches along with other recommendations per neurology.  Neurology agrees with follow up with eye clinic.  Add zofran and oxycodone for nausea and pain.  Tylenol and ibuprofen.  REturn if any concerns.  Follow up with primary MD also.    Please make an appointment to follow up with Your Primary Care Provider, [facility] (phone: [phone]), and Eye Clinic (phone: [phone]) as soon as possible.        Results for orders placed or performed during the hospital encounter of 04/09/24   Erythrocyte sedimentation rate auto     Status: Normal   Result Value Ref Range    Erythrocyte Sedimentation Rate 17 0 - 20 mm/hr   CRP inflammation     Status: Normal   Result Value Ref Range    CRP Inflammation <3.00 <5.00 mg/L   INR     Status: Normal   Result Value Ref Range    INR 1.04 0.85 - 1.15   Partial thromboplastin time     Status: Normal   Result Value Ref Range    aPTT 23 22 - 38 Seconds   Comprehensive metabolic panel     Status: Abnormal   Result Value Ref Range    Sodium 137 135 - 145 mmol/L    Potassium 3.8 3.4 - 5.3 mmol/L    Carbon Dioxide (CO2) 21 (L) 22 - 29 mmol/L    Anion Gap 11 7 - 15 mmol/L    Urea Nitrogen 5.2 (L) 6.0 - 20.0 mg/dL    Creatinine 0.70 0.51 - 0.95 mg/dL    GFR Estimate  >90 >60 mL/min/1.73m2    Calcium 9.0 8.6 - 10.0 mg/dL    Chloride 105 98 - 107 mmol/L    Glucose 83 70 - 99 mg/dL    Alkaline Phosphatase 63 40 - 150 U/L    AST 16 0 - 45 U/L    ALT 11 0 - 50 U/L    Protein Total 7.2 6.4 - 8.3 g/dL    Albumin 3.9 3.5 - 5.2 g/dL    Bilirubin Total 0.2 <=1.2 mg/dL   Magnesium     Status: Normal   Result Value Ref Range    Magnesium 2.0 1.7 - 2.3 mg/dL   TSH     Status: Abnormal   Result Value Ref Range    TSH 6.68 (H) 0.30 - 4.20 uIU/mL   HCG qualitative Blood     Status: Normal   Result Value Ref Range    hCG Serum Qualitative Negative Negative   CBC with platelets and differential     Status: None   Result Value Ref Range    WBC Count 8.6 4.0 - 11.0 10e3/uL    RBC Count 4.58 3.80 - 5.20 10e6/uL    Hemoglobin 13.4 11.7 - 15.7 g/dL    Hematocrit 39.9 35.0 - 47.0 %    MCV 87 78 - 100 fL    MCH 29.3 26.5 - 33.0 pg    MCHC 33.6 31.5 - 36.5 g/dL    RDW 13.0 10.0 - 15.0 %    Platelet Count 226 150 - 450 10e3/uL    % Neutrophils 69 %    % Lymphocytes 23 %    % Monocytes 7 %    % Eosinophils 1 %    % Basophils 0 %    % Immature Granulocytes 0 %    NRBCs per 100 WBC 0 <1 /100    Absolute Neutrophils 5.9 1.6 - 8.3 10e3/uL    Absolute Lymphocytes 1.9 0.8 - 5.3 10e3/uL    Absolute Monocytes 0.6 0.0 - 1.3 10e3/uL    Absolute Eosinophils 0.1 0.0 - 0.7 10e3/uL    Absolute Basophils 0.0 0.0 - 0.2 10e3/uL    Absolute Immature Granulocytes 0.0 <=0.4 10e3/uL    Absolute NRBCs 0.0 10e3/uL   CBC with platelets differential     Status: None    Narrative    The following orders were created for panel order CBC with platelets differential.  Procedure                               Abnormality         Status                     ---------                               -----------         ------                     CBC with platelets and d...[[id number]]                      Final result                 Please view results for these tests on the individual orders.

## 2024-04-09 NOTE — CONSULTS
OPHTHALMOLOGY CONSULT NOTE  04/09/24    Patient: Keyla Garcia    ASSESSMENT/PLAN:     Keyla Garcia is a 38 year old female who presented with     Staph marginal keratitis, right eye  Blepharitis, OU (R > L)   Severe Dry Eye Syndrome, OU (L > R)  38 F with right-sided headache x 3 days, describes as 10/10 stabbing pain. No past medical history and no history of headache/migraine.   - Presents with 20/20 vision OU, normal pressures, and otherwise normal base exam. Right upper lid is slightly swollen and a little red. Glabella and right eyebrow showing ?early signs of rash, though no monalisa maculopapular eruption and pattern not respecting midline. Demonstrates inflammatory changes consistent with blepharitis at the lid margin, collarettes, meibomian gland dysfunction, and severe dry eye disease in both eyes.   - Anterior segment exam shows mild injection primarily nasally. Two punctate (1 mm) corneal infiltrates about 1 mm from limbus with overlying epithelial defect at 1 o'clock and two more of the same at 4 o'clock with a clear space between each lesion and one another, as well as between each lesion and the limbus. No other ulcers, infiltrates, dendrites, or pseudodendrites. AC is deep and quiet, no other abnormalities noted on dilated fundus exam.   - CT orbits obtained 4/07 for the same complaint at a different emergency department did not show evidence of orbital cellulitis  - The severity of the patient's headache may not fully be explicable by the above diagnosis. However, it is undoubtedly contributing to her picture of unilateral periorbital discomfort.    Plan:   - Preservative free artificial tears four times per day, BOTH eyes  - Maxitrol drops four times per day, RIGHT eye  - Lid hygiene: wash eyelids with Ocusoft lid scrub, dilute baby shampoo, or a gentle foaming cleanser before bed at night, BOTH eyes  - Use warm compresses three times per day over BOTH eyes for 5-10 minutes  - If fails to  resolve, can consider short course of doxycycline 100 mg PO bid  - Follow up in clinic Friday or Monday (message sent to schedulers)  - Please provide the following information to the patient with regard to clinic follow up:     [facility]  [address] [place] [address]  [phone]    It is our pleasure to participate in this patient's care and treatment. Please contact us with any further questions or concerns.    Discussed with Dr. Toribio, senior resident who agreed with this assessment and plan.     Ophthalmology will sign off. Please contact ophthalmologist on call with any questions or concerns. We appreciate your care of this patient.    Philipp Cruz MD  PGY-2, Ophthalmology  [facility]  04/09/24      HISTORY OF PRESENTING ILLNESS:     Keyla Garcia is a 38 year old female with pmhx seasonal allergies who presents with 10/10 right sided periorbital headache.     Describes as 10/10 unremitting that has improved since coming to ED and getting migraine meds.   Started Saturday, is localized to right eye and states that she feels a slight worsening when looking up and outward.   Denies change in vision, double vision, flashes, floaters, curtain-like defects, photophobia, and pain with eye movements.   Was seen at outside emergency department 4/7/24 (HPI below):     Keyla Garcia is a 38 year old female who presents to the department for evaluation of right-sided periorbital headache.  Patient explains that yesterday morning she developed achy discomfort around the right eye orbit which has progressed in severity since onset, also appreciates some swelling and edema of her eyelids.  Today she began to feel tenderness to palpation around the right eye orbit so eventually came to department for evaluation.  She denies history of similar symptoms in the past, no previous diagnosis of migraine or cluster headaches.  She denies changes in vision from  baseline, nasal congestion, sore throat, neck pain or stiffness.  No other complaint.    CT head and orbits 4/7 demonstrated unilateral ethmoidal sinusitis without evidence of bony erosion or orbital involvement    10+ review of systems were otherwise negative except for that which has been stated above.    OCULAR/MEDICAL/SURGICAL HISTORIES:     Past Ocular History: None    Pertinent Systemic Medications:   None    Past Medical History:  No past medical history on file.    Past Surgical History:   No past surgical history on file.    Family History: No history of macular degeneration or glaucoma    Social History: No tobacco use    EXAMINATION:     Base Eye Exam       Visual Acuity (Snellen - Linear)         Right Left    Near sc 20/20 20/20              Tonometry (Tonopen, 4:00 PM)         Right Left    Pressure 19 16              Pupils         Pupils    Right PERRL    Left PERRL              Visual Fields         Left Right     Full Full              Extraocular Movement         Right Left     Full, Ortho Full, Ortho              Dilation       Both eyes: 1.0% Mydriacyl, 2.5% Rex Synephrine @ 4:00 PM                  Slit Lamp and Fundus Exam       External Exam         Right Left    External Possible early signs of rash developing vs skin irritation at eyebrow extending to glabella, not respecting midline               Slit Lamp Exam         Right Left    Lids/Lashes Mild upper lid swelling, inflammatory changes consistent with blepharitis at the lid margin, collarettes, meibomian gland dysfunction inflammatory changes consistent with blepharitis at the lid margin, collarettes, meibomian gland dysfunction    Conjunctiva/Sclera Nasal > temporal injection White and quiet    Cornea Two punctate (1 mm) corneal infiltrates about 1 mm from limbus with overlying epithelial defect at 1 o'clock at 4 o'clock and two more of the same with a clear space between each lesion and one another, as well as between each lesion and  the limbus. No other ulcers, infiltrates, dendrites, or pseudodendrites. Diffuse severe PEE    Anterior Chamber Deep and quiet; no AC cell or flare Deep and quiet    Iris round and reactive, no TID's Round and reactive    Lens clear Clear    Anterior Vitreous no cell Normal              Fundus Exam         Right Left    Disc no swelling or heme no swelling or heme    C/D Ratio 0.35 0.25    Macula flat attached no lesions flat attached no lesions    Vessels no sheathing, heme, or cws no sheathing, heme, or cws    Periphery no lesions no lesions                    Labs/Studies/Imaging Performed  CT orbits w/ contrast 4/7/24 showed  1.  Normal appearance of perioperative soft tissues and intraorbital structures bilaterally.  2.  Partial opacification right ethmoidal air cells. Please correlate clinically for acute sinusitis.     Philipp Cruz MD  Resident Physician, PGY2  Department of Ophthalmology  04/09/24 4:01 PM

## 2024-04-09 NOTE — CONSULTS
[facility]  Neurology Consultation    Patient Name:  Keyla Garcia  MRN:  [id number]    :  1985  Date of Service:  2024  Primary care provider:  No Ref-Primary, Physician      Neurology consultation service was asked to see Keyla Garcia by Dr. Guerra to evaluate headache.    Chief Complaint:  Right eye pain, swelling, periorbital headache    History of Present Illness:   Keyla Garcia is a 38 year old female with no significant pmhx presented with right eye pain and swelling since Saturday. States she tried tylenol, ibuprofen at home without much relief. Presented to ED 2 days ago and got CT scan that only showed sinusitis. Represented today due to persistent right sided headache. Some nausea. No sick symptoms, new rashes, new medications. Some numbness/tingling on right side of face. No jaw claudication. No vision changes.     IN ED, treated with headache cocktail (toradol, benadryl, dilaudid) + oxygen with complete symptom resolution. Patient was expected to discharge home but then headache redeveloped and neurology was consulted. Ophthalmology had seen patient earlier today and diagnosed her with staph marginal keratitis of right eye and prescribed abx eye drops.    ROS  A comprehensive ROS was performed and pertinent findings were included in HPI.     PMH  No past medical history on file.  No past surgical history on file.    Medications   I have personally reviewed the patient's medication list.     Allergies  I have personally reviewed the patient's allergy list.    Patient has cat allergy    Family History    Denies family hx of autoimmune disease, migraine    Physical Examination   Vitals: BP (!) 152/107   Pulse 95   Temp 98.4  F (36.9  C) (Oral)   Resp 14   SpO2 100%   General: Lying in bed, NAD  Head: NC/AT. Right eyelid swelling, right eye erythematous intermittently tearing   Eyes: no icterus, op pink and moist  Cardiac:  Extremities warm,  no edema.   Respiratory: non-labored on RA  Skin: mild facial rash around right eye  Psych: Mood pleasant, affect congruent  Neuro:  Mental status: Awake, alert, attentive, oriented to self, time, place, and circumstance. Language is fluent and coherent with intact comprehension of complex commands, naming and repetition.  Cranial nerves: VFF, kenzie pupils dilated and unreactive to light, conjugate gaze, EOMI (pain when looking fully to right, mild-mod pain when looking up, mild discomfort looking down, no pain looking fully to left) facial sensation intact, eyebrow raise symmetric, eye closure strong, no facial droop with activation, shoulder shrug strong, tongue midline, no dysarthria.   Motor: Normal bulk and tone. No abnormal movements. 5/5 strength bilaterally in deltoids, biceps, triceps, hand , hip flexors, hip extensors, knee extension, plantarflexion, dorsiflexion.   Reflexes: Normo-reflexic and symmetric biceps, brachioradialis, triceps, patellae, and achilles. no clonus, toes down-going.  Sensory: Intact to light touch in proximal and distal aspects of all 4 extremities . paresthesias in V1-2, face symmetric, Mildly TTP frontal , maxillary and sphenoid sinus region of right face.  Coordination: FNF and HS without ataxia or dysmetria. Rapid alternating movements intact.   Gait: did not assess    Investigations   I have personally reviewed pertinent labs, tests, and radiological imaging. Discussion of notable findings is included under Impression.     Was patient transferred from outside hospital?   No    Impression  #Staph marginal keratitis, right eye  #Blepharitis, OU (R > L)   #Severe Dry Eye Syndrome, OU (L > R)  #Right sided migraine, persistent  Given headache cocktail (benadryl, toradol, dilaudid) and oxygen with minimal relief. Seen by ophthalmology, diagnosed with staph marginal keratitis of right eye. Recommended to start on maxitrol drops and refresh tears until follow up appt in eye clinic in  a few days. Per ophtho note, anterior segment exam showed mild injection, some corneal infiltrates and inflammatory changes c/f blepharitis.  Neuro exam reassuring.Notable for some facial pain upon palpation of periorbital sinuses on right. No nystagmus. EOMI intact, VFF. CT head/orbits for similar presentation on 4/7 notable for sinusitis, otherwise negative, reassuring against orbital cellulitis. Discussed with patient option for further workup including MRI orbits to definitely rule out any other pathologies though suspect no major changes since CT scan was done. Also discussed how her new diagnosis of keratitis c/b blepharitis along with severe dry eye syndrome could possibly explain for the culmination of symptoms. Per literature, symptoms of keratitis include facial pain, swelling, severe headache. Patient reassured and at this time would like to defer further imaging. All questions answered. Recommend close follow up with opthalmology and compliance with new eye medications. Discussed recommendations F2F with ED provider.      Recommendations  -No further workup from general neurology standpoint  -prn antiemetics, pain control per primary provider  -f/u with outpatient ophthalmology for treatment of staph marginal keratitis    Thank you for involving Neurology in the care of Keyla Garcia.  Please do not hesitate to call with questions/concerns (consult pager [phone]).      Patient was discussed with Dr. Dale over phone.    Bonnie Crowley MD  PGY-1, Neurology  [facility]

## 2024-04-10 ENCOUNTER — HOSPITAL ENCOUNTER (EMERGENCY)
Facility: CLINIC | Age: 39
Discharge: HOME | End: 2024-04-11
Attending: FAMILY MEDICINE | Admitting: FAMILY MEDICINE
Payer: COMMERCIAL

## 2024-04-10 VITALS
HEART RATE: 77 BPM | BODY MASS INDEX: 29.44 KG/M2 | OXYGEN SATURATION: 100 % | HEIGHT: 62 IN | RESPIRATION RATE: 18 BRPM | WEIGHT: 160 LBS | DIASTOLIC BLOOD PRESSURE: 86 MMHG | SYSTOLIC BLOOD PRESSURE: 131 MMHG | TEMPERATURE: 98.8 F

## 2024-04-10 DIAGNOSIS — B02.9 HERPES ZOSTER WITHOUT COMPLICATION: ICD-10-CM

## 2024-04-10 LAB
HOLD SPECIMEN: NORMAL

## 2024-04-10 PROCEDURE — 258N000003 HC RX IP 258 OP 636: Performed by: FAMILY MEDICINE

## 2024-04-10 PROCEDURE — 96375 TX/PRO/DX INJ NEW DRUG ADDON: CPT | Performed by: FAMILY MEDICINE

## 2024-04-10 PROCEDURE — 99284 EMERGENCY DEPT VISIT MOD MDM: CPT | Performed by: FAMILY MEDICINE

## 2024-04-10 PROCEDURE — 250N000013 HC RX MED GY IP 250 OP 250 PS 637: Performed by: FAMILY MEDICINE

## 2024-04-10 PROCEDURE — 99284 EMERGENCY DEPT VISIT MOD MDM: CPT | Mod: 25 | Performed by: FAMILY MEDICINE

## 2024-04-10 PROCEDURE — 250N000012 HC RX MED GY IP 250 OP 636 PS 637: Performed by: FAMILY MEDICINE

## 2024-04-10 PROCEDURE — 250N000011 HC RX IP 250 OP 636: Performed by: FAMILY MEDICINE

## 2024-04-10 PROCEDURE — 96361 HYDRATE IV INFUSION ADD-ON: CPT | Performed by: FAMILY MEDICINE

## 2024-04-10 PROCEDURE — 96374 THER/PROPH/DIAG INJ IV PUSH: CPT | Performed by: FAMILY MEDICINE

## 2024-04-10 RX ORDER — DIPHENHYDRAMINE HYDROCHLORIDE 50 MG/ML
12.5 INJECTION INTRAMUSCULAR; INTRAVENOUS ONCE
Status: COMPLETED | OUTPATIENT
Start: 2024-04-10 | End: 2024-04-10

## 2024-04-10 RX ORDER — KETOROLAC TROMETHAMINE 15 MG/ML
10 INJECTION, SOLUTION INTRAMUSCULAR; INTRAVENOUS ONCE
Status: COMPLETED | OUTPATIENT
Start: 2024-04-10 | End: 2024-04-10

## 2024-04-10 RX ORDER — MORPHINE SULFATE 4 MG/ML
4 INJECTION, SOLUTION INTRAMUSCULAR; INTRAVENOUS ONCE
Status: COMPLETED | OUTPATIENT
Start: 2024-04-10 | End: 2024-04-10

## 2024-04-10 RX ORDER — VALACYCLOVIR HYDROCHLORIDE 500 MG/1
1000 TABLET, FILM COATED ORAL ONCE
Status: COMPLETED | OUTPATIENT
Start: 2024-04-10 | End: 2024-04-10

## 2024-04-10 RX ORDER — VALACYCLOVIR HYDROCHLORIDE 1 G/1
1000 TABLET, FILM COATED ORAL 3 TIMES DAILY
Qty: 21 TABLET | Refills: 0 | Status: SHIPPED | OUTPATIENT
Start: 2024-04-10 | End: 2024-04-12

## 2024-04-10 RX ORDER — PREDNISONE 20 MG/1
40 TABLET ORAL ONCE
Status: COMPLETED | OUTPATIENT
Start: 2024-04-10 | End: 2024-04-10

## 2024-04-10 RX ORDER — OXYCODONE HYDROCHLORIDE 5 MG/1
5-10 TABLET ORAL EVERY 6 HOURS PRN
Qty: 15 TABLET | Refills: 0 | Status: SHIPPED | OUTPATIENT
Start: 2024-04-10 | End: 2024-04-13

## 2024-04-10 RX ORDER — PREDNISONE 20 MG/1
20 TABLET ORAL 2 TIMES DAILY
Qty: 10 TABLET | Refills: 0 | Status: SHIPPED | OUTPATIENT
Start: 2024-04-10 | End: 2024-04-15

## 2024-04-10 RX ADMIN — DIPHENHYDRAMINE HYDROCHLORIDE 12.5 MG: 50 INJECTION, SOLUTION INTRAMUSCULAR; INTRAVENOUS at 22:59

## 2024-04-10 RX ADMIN — MORPHINE SULFATE 4 MG: 4 INJECTION, SOLUTION INTRAMUSCULAR; INTRAVENOUS at 22:59

## 2024-04-10 RX ADMIN — PROMETHAZINE HYDROCHLORIDE 12.5 MG: 25 INJECTION INTRAMUSCULAR; INTRAVENOUS at 23:00

## 2024-04-10 RX ADMIN — PREDNISONE 40 MG: 20 TABLET ORAL at 22:59

## 2024-04-10 RX ADMIN — DEXTROSE MONOHYDRATE AND SODIUM CHLORIDE: 5; .9 INJECTION, SOLUTION INTRAVENOUS at 23:00

## 2024-04-10 RX ADMIN — VALACYCLOVIR HYDROCHLORIDE 1000 MG: 500 TABLET, FILM COATED ORAL at 22:59

## 2024-04-10 RX ADMIN — KETOROLAC TROMETHAMINE 10 MG: 15 INJECTION, SOLUTION INTRAMUSCULAR; INTRAVENOUS at 22:58

## 2024-04-10 ASSESSMENT — ACTIVITIES OF DAILY LIVING (ADL)
ADLS_ACUITY_SCORE: 35
ADLS_ACUITY_SCORE: 35

## 2024-04-10 NOTE — TELEPHONE ENCOUNTER
Called and spoke to [place]     Made an appointment for 4/12 at 930 am with acute     Discussed     Wait time     Billing / insurance     Parking / cost    Confirmed clinic address     Billing . Insurance     Gladis Reid Communication Facilitator on 4/10/2024 at 10:03 AM

## 2024-04-11 NOTE — ED PROVIDER NOTES
HPI   Patient is a 38-year-old female presenting with right eye pain, swelling, and headache.  On 04/07 the patient was seen here in the ED and was thought to have an ethmoid sinusitis.  She had a broad workup including serum testing, CT scan of the head and of the orbits.  Then, on 04/08 the patient was seen at the [facility] and she again had a broad workup.  She did not have repeat imaging but she did have repeat blood work.  Her eye pressure was found to be 24.  Ophthalmology was consulted and some keratitis was recognized.  Neurology was consulted and there was no further recommendations provided.  ESR and CRP were negative.  She has continued outpatient opioid medication for pain and Augmentin as previously prescribed.    The patient presents today with worsened swelling and new redness about the forehead and upper eye.  She tells me that the majority of her pain is superficial and its above the eye.  However, she does say it hurts when she is looking to the right side and this pain is in the eye itself.  She has mild light sensitivity.  She has clear tearing of the eye.  She does not currently have a severe headache but she has been experiencing headache symptoms over the past couple of days.  She has been intermittently nauseous.  Her pain continues to be severe.  She has been rundown and tired and mostly spending time in bed.  No objective fever.  She has not had similar symptoms previously.      Allergies:  No Known Allergies  Problem List:    There are no problems to display for this patient.     Past Medical History:    No past medical history on file.  Past Surgical History:    No past surgical history on file.  Family History:    No family history on file.  Social History:  Marital Status:   [2]      Medications:    oxyCODONE (ROXICODONE) 5 MG tablet  predniSONE (DELTASONE) 20 MG tablet  valACYclovir (VALTREX) 1000 mg tablet  amoxicillin-clavulanate (AUGMENTIN) 875-125 MG  "tablet  neomycin-polymixin-dexAMETHasone (MAXITROL) 0.1 % ophthalmic suspension  ondansetron (ZOFRAN ODT) 4 MG ODT tab  polyvinyl alcohol (LIQUIFILM TEARS) 1.4 % ophthalmic solution  prochlorperazine (COMPAZINE) 10 MG tablet      Review of Systems   All other systems reviewed and are negative.      PE   BP: 130/84  Pulse: 77  Temp: 98.8  F (37.1  C)  Resp: 18  Height: 157.5 cm (5' 2\")  Weight: 72.6 kg (160 lb)  SpO2: 98 %  Physical Exam  Vitals reviewed.   Constitutional:       Appearance: She is well-developed.      Comments: The patient looks quite uncomfortable.  She is with flat affect but answering questions well.   HENT:      Head: Normocephalic and atraumatic.      Comments: The patient has a clear red demarcation along the midline of her forehead.  The skin is raised and mildly bumpy.  There is some smaller satellite areas that are lateral to this.  The swelling includes the skin of her eyebrow.  Her upper lid is swollen.  All of this is without severe tenderness.     Right Ear: External ear normal.      Left Ear: External ear normal.      Nose: Nose normal.      Mouth/Throat:      Mouth: Mucous membranes are moist.      Pharynx: Oropharynx is clear.   Eyes:      Extraocular Movements: Extraocular movements intact.      Pupils: Pupils are equal, round, and reactive to light.      Comments: Eye injection on the right.   Cardiovascular:      Rate and Rhythm: Normal rate and regular rhythm.   Pulmonary:      Effort: Pulmonary effort is normal.   Musculoskeletal:         General: Normal range of motion.      Cervical back: Normal range of motion.   Skin:     General: Skin is warm and dry.   Neurological:      Mental Status: She is alert and oriented to person, place, and time.   Psychiatric:         Behavior: Behavior normal.         ED COURSE and MDM   2217.  The patient has evidence of early herpes zoster.  I will treat with valacyclovir, prednisone, morphine for pain, Toradol for pain, Phenergan and Benadryl " for pain and nausea.  No further workup at this time.    2343.  Patient somewhat improved.  Oxycodone prescription given.  Valacyclovir and prednisone prescriptions given.  Follow-up with ophthalmology as scheduled.    Electronic medical chart reviewed, including medical problems, medications, medical allergies, social history.  Recent hospitalizations and surgical procedures reviewed.  Recent clinic visits and consultations reviewed.  Recent labs and test results reviewed.  Nursing notes reviewed.    The patient, their parent if applicable, and/or their medical decision maker(s) and I have reviewed all of the available historical information, applicable PMH, physical exam findings, and objective diagnostic data gathered during this ED visit.  We then discussed all work-up options and then together agreed upon the course taken during this visit.  The ultimate disposition and plan was a cooperative decision made between myself and the patient, their parent if applicable, and/or their legal decision maker(s).  The risks and benefits of all decisions made during this visit were discussed to the best of my abilities given the circumstances, and all parties are understanding of the pertinent ramifications of these decisions.      LABS  Labs Ordered and Resulted from Time of ED Arrival to Time of ED Departure - No data to display    IMAGING  Images reviewed by me.  Radiology report also reviewed.  No orders to display       Procedures    Medications   dextrose 5% and 0.9% NaCl infusion (has no administration in time range)   dextrose 5% and 0.9% NaCl infusion ( Intravenous $New Bag 4/10/24 2300)   valACYclovir (VALTREX) tablet 1,000 mg (1,000 mg Oral $Given 4/10/24 2259)   predniSONE (DELTASONE) tablet 40 mg (40 mg Oral $Given 4/10/24 2259)   morphine (PF) injection 4 mg (4 mg Intravenous $Given 4/10/24 2259)   ketorolac (TORADOL) injection 10 mg (10 mg Intravenous $Given 4/10/24 2258)   promethazine (PHENERGAN) 12.5 mg  in sodium chloride 0.9 % 55 mL intermittent infusion (12.5 mg Intravenous $Given 4/10/24 1960)   diphenhydrAMINE (BENADRYL) injection 12.5 mg (12.5 mg Intravenous $Given 4/10/24 0711)         IMPRESSION       ICD-10-CM    1. Herpes zoster without complication  B02.9                Medication List        Started      predniSONE 20 MG tablet  Commonly known as: DELTASONE  20 mg, Oral, 2 TIMES DAILY     valACYclovir 1000 mg tablet  Commonly known as: VALTREX  1,000 mg, Oral, 3 TIMES DAILY            Modified      oxyCODONE 5 MG tablet  Commonly known as: ROXICODONE  5-10 mg, Oral, EVERY 6 HOURS PRN  What changed:   how much to take  reasons to take this                                  Chucky Ansari MD  04/10/24 8815

## 2024-04-11 NOTE — ED TRIAGE NOTES
Pt arrives for concerns of increased swelling, redness, and pain around her R eye. Pt was see over the weekend and yesterday but sx have progressed and gotten worse, pain is not relieved with oxycodone, APAP, or ibuprofen. Pt started on ABX on Sunday.

## 2024-04-11 NOTE — DISCHARGE INSTRUCTIONS
RETURN TO THE EMERGENCY ROOM FOR THE FOLLOWING:    Concerning changes in vision, repeated vomiting and dehydration, concerning changes in behavior/confusion, or at anytime for any concern.    FOLLOW UP:    With ophthalmology as scheduled.    TREATMENT RECOMMENDATIONS:    Ibuprofen 600 mg every six hours for pain (7 days duration).  Tylenol 1000 mg every six hours for pain (7 days duration).  Therefore, you can alternate these every three hours and do it safely.  ONLY take these medications if it is safe to do so given your medical history.    Oxycodone for pain not relieved by the above.    Consider MiraLAX to help promote a regular stool pattern.  The goal is to have a soft, easy stool either every day or every other day.  You can titrate MiraLAX to achieve this goal.  MiraLAX is a nonsoluble fiber and is not addictive.  It will not cause bowel dysfunction or pathology.      Prednisone.  Start the prescription tomorrow.    Valacyclovir.  Start the prescription tomorrow.    NURSE ADVICE LINE:  [phone] or [phone]

## 2024-04-12 ENCOUNTER — OFFICE VISIT (OUTPATIENT)
Dept: OPHTHALMOLOGY | Facility: CLINIC | Age: 39
End: 2024-04-12
Attending: OPHTHALMOLOGY
Payer: COMMERCIAL

## 2024-04-12 DIAGNOSIS — B02.30 HERPES ZOSTER OPHTHALMICUS OF RIGHT EYE: Primary | ICD-10-CM

## 2024-04-12 PROCEDURE — 99204 OFFICE O/P NEW MOD 45 MIN: CPT | Mod: GC | Performed by: STUDENT IN AN ORGANIZED HEALTH CARE EDUCATION/TRAINING PROGRAM

## 2024-04-12 RX ORDER — PREDNISOLONE ACETATE 10 MG/ML
1 SUSPENSION/ DROPS OPHTHALMIC 4 TIMES DAILY
Qty: 10 ML | Refills: 0 | Status: SHIPPED | OUTPATIENT
Start: 2024-04-12 | End: 2024-05-23

## 2024-04-12 RX ORDER — VALACYCLOVIR HYDROCHLORIDE 1 G/1
TABLET, FILM COATED ORAL
Qty: 21 TABLET | Refills: 0 | Status: SHIPPED | OUTPATIENT
Start: 2024-04-12 | End: 2024-04-12

## 2024-04-12 RX ORDER — ERYTHROMYCIN 5 MG/G
0.5 OINTMENT OPHTHALMIC 3 TIMES DAILY
Qty: 3.5 G | Refills: 2 | Status: SHIPPED | OUTPATIENT
Start: 2024-04-12 | End: 2024-04-12

## 2024-04-12 RX ORDER — GABAPENTIN 100 MG/1
100 CAPSULE ORAL 3 TIMES DAILY
Qty: 60 CAPSULE | Refills: 1 | Status: CANCELLED | OUTPATIENT
Start: 2024-04-12

## 2024-04-12 RX ORDER — PREDNISOLONE ACETATE 10 MG/ML
1 SUSPENSION/ DROPS OPHTHALMIC 4 TIMES DAILY
Qty: 10 ML | Refills: 0 | Status: SHIPPED | OUTPATIENT
Start: 2024-04-12 | End: 2024-04-12

## 2024-04-12 RX ORDER — VALACYCLOVIR HYDROCHLORIDE 1 G/1
TABLET, FILM COATED ORAL
Qty: 21 TABLET | Refills: 0 | Status: SHIPPED | OUTPATIENT
Start: 2024-04-12 | End: 2024-04-25

## 2024-04-12 RX ORDER — ERYTHROMYCIN 5 MG/G
0.5 OINTMENT OPHTHALMIC 3 TIMES DAILY
Qty: 3.5 G | Refills: 2 | Status: SHIPPED | OUTPATIENT
Start: 2024-04-12 | End: 2024-04-25

## 2024-04-12 ASSESSMENT — SLIT LAMP EXAM - LIDS: COMMENTS: MGD

## 2024-04-12 ASSESSMENT — CUP TO DISC RATIO: OD_RATIO: 0.35

## 2024-04-12 ASSESSMENT — VISUAL ACUITY
OD_SC+: +2
OD_PH_SC: 20/20
OS_SC: 20/20
OD_SC: 20/30
METHOD: SNELLEN - LINEAR
OD_PH_SC+: -1

## 2024-04-12 ASSESSMENT — TONOMETRY
IOP_METHOD: TONOPEN
OS_IOP_MMHG: 18
OD_IOP_MMHG: 20

## 2024-04-12 ASSESSMENT — EXTERNAL EXAM - LEFT EYE: OS_EXAM: NORMAL

## 2024-04-12 NOTE — PATIENT INSTRUCTIONS
Take prednisolone eye drop 4 times per day in the right eye     Place a small amount of erythromycin ointment in the right eye 3 times per day. If placed at same time as eye drop, place eye drop first and ointment second.    Take 1 pill of valtrex 3 times per day for 14 total days    Call if you have any vision change, flashes of light or floaters.

## 2024-04-12 NOTE — PROGRESS NOTES
Ophthalmology Acute Clinic     [facility]       Herpes Zoster Evaluation    In right eye.          Last edited by José Luis Phelps MD on 4/12/2024 10:01 AM.        HPI:   Keyla Garcia is a 38 year old female who presents for follow up of possible HZO right eye.     4/6 had right sided 10/10 stabbing headache with questionable early rash. Had blepharitis and concern for infiltrate suspicious for staph marginal started on maxitrol QID, AT, and lid hygiene.    Seen 4/10 with rash eruption, started on valtrex 1g TID and prednisone 20mg BID. Symptoms are mainly severe right sided facial pain, though no ocular pain. No vision change; no new flashes, floaters, or curtain down visual field.    Past Ocular history:   - Contact lens wear: No  - Ocular Surgical History: No  - Current Eye drops: Maxitrol TID right eye     PMHx: None    Review of systems for the eyes was negative other than the pertinent positives/negatives listed in the HPI.      Assessment & Plan      Keyla Garcia is a 38 year old female with the following diagnoses:     Herpes Zoster Ophthalmicus, right sided  Acute anterior uveitis, right eye  - 38 year old otherwise healthy female who presented with HZO right eye, initial symptoms were right sided severe headache/face pain 4/6/24 with eruption of rash 4/10/24 and she was started on valtrex 1g TID and prednisone 20 mg. Only nasal subconjunctival hemorrhage and trace AC cell found on dilated exam today. No posterior involvement.   - Continue PO valtrex 1g TID for 14 days  - Stop maxitrol, start erythromycin ophthalmic ointment TID right eye  - Start Prednisolone acetate 1% QID OD  - PO prednisone as per PCP/referring provider  - PO Augmentin BID x 10 days, discontinue, no signs of superimposed infection, though will defer to primary/referring provider  - medication side effects reviewed  - Establish care with PCP for continued Rx if needed for neuralgia  - Return/RD precautions discussed    MGD of  upper and lower lids of both eyes  Dry Eyes OU  - no findings suggestive of staph marginal ulcer today  - warm compresses BID OU x 5-10 min each time  - PFATs QID and prn OU    Counseled return/RD precautions    Patient disposition:   Return in about 10 days (around 4/22/2024) for Follow up, VT, color plates, or sooner changes.    Patient seen with Dr. Gabo Phelps MD  Resident Physician, PGY-2  Department of Ophthalmology    Attending Physician Attestation:  Complete documentation of historical and exam elements from today's encounter can be found in the full encounter summary report (not reduplicated in this progress note).  I personally obtained the chief complaint(s) and history of present illness.  I confirmed and edited as necessary the review of systems, past medical/surgical history, family history, social history, and examination findings as documented by others; and I examined the patient myself.  I personally reviewed the relevant tests, images, and reports as documented above.  I formulated and edited as necessary the assessment and plan and discussed the findings and management plan with the patient and family. . - Shital Ayon MD

## 2024-04-19 ENCOUNTER — OFFICE VISIT (OUTPATIENT)
Dept: FAMILY MEDICINE | Facility: CLINIC | Age: 39
End: 2024-04-19
Payer: COMMERCIAL

## 2024-04-19 VITALS
DIASTOLIC BLOOD PRESSURE: 80 MMHG | OXYGEN SATURATION: 99 % | HEART RATE: 91 BPM | RESPIRATION RATE: 16 BRPM | BODY MASS INDEX: 29.59 KG/M2 | SYSTOLIC BLOOD PRESSURE: 122 MMHG | TEMPERATURE: 98 F | WEIGHT: 161.8 LBS

## 2024-04-19 DIAGNOSIS — B02.30 HERPES ZOSTER WITH OPHTHALMIC COMPLICATION, UNSPECIFIED HERPES ZOSTER EYE DISEASE: Primary | ICD-10-CM

## 2024-04-19 PROCEDURE — 99203 OFFICE O/P NEW LOW 30 MIN: CPT | Performed by: FAMILY MEDICINE

## 2024-04-19 RX ORDER — GABAPENTIN 100 MG/1
100 CAPSULE ORAL 3 TIMES DAILY
Qty: 60 CAPSULE | Refills: 1 | Status: SHIPPED | OUTPATIENT
Start: 2024-04-19 | End: 2024-05-09

## 2024-04-19 RX ORDER — PROCHLORPERAZINE MALEATE 10 MG
10 TABLET ORAL EVERY 6 HOURS PRN
Qty: 15 TABLET | Refills: 0 | Status: SHIPPED | OUTPATIENT
Start: 2024-04-19

## 2024-04-19 NOTE — PROGRESS NOTES
Assessment & Plan     Herpes zoster with ophthalmic complication, unspecified herpes zoster eye disease  Discussed how to use gabapentin to help with nerve pain.  Complete antivirals and close follow-up with ophthalmology.  - prochlorperazine (COMPAZINE) 10 MG tablet  Dispense: 15 tablet; Refill: 0  - gabapentin (NEURONTIN) 100 MG capsule  Dispense: 60 capsule; Refill: 1      MED REC REQUIRED  Post Medication Reconciliation Status: discharge medications reconciled and changed, per note/orders    Maikel Ramires is a 38 year old, presenting for the following health issues:  [facility] ER F/U        4/19/2024     9:17 AM   Additional Questions   Roomed by Mary BABIN MA   Accompanied by Self     History of Present Illness       Reason for visit:  [facility] primary care    She eats 2-3 servings of fruits and vegetables daily.She consumes 0 sweetened beverage(s) daily.She exercises with enough effort to increase her heart rate 9 or less minutes per day.  She exercises with enough effort to increase her heart rate 3 or less days per week.   She is taking medications regularly.     ED/UC Followup:    Facility:  [place] [place] [facility]  Date of visit: 4/7/2024, 4/9/2024, 4/10/2024  Reason for visit: Herpes zoster   Current Status: Getting better. Is still having some pain    Ongoing pain over her face.  She is completing antibiotics for sinus infection.  Continues on Valtrex as directed by ophthalmology.  She has follow-up with them in 1 week.  No new lesions is having significant pins-and-needles type pain.    Objective    /80 (BP Location: Right arm, Patient Position: Chair, Cuff Size: Adult Regular)   Pulse 91   Resp 16   Wt 73.4 kg (161 lb 12.8 oz)   SpO2 99%   BMI 29.59 kg/m    Body mass index is 29.59 kg/m .  Physical Exam  Constitutional:       Appearance: Normal appearance.   Cardiovascular:      Rate and Rhythm: Normal rate.   Pulmonary:      Effort: Pulmonary effort is normal.       Breath sounds: Normal breath sounds.   Skin:     Findings: Erythema and rash present.   Neurological:      Mental Status: She is alert.          Signed Electronically by: SUSY SHARP DO

## 2024-04-25 ENCOUNTER — OFFICE VISIT (OUTPATIENT)
Dept: OPHTHALMOLOGY | Facility: CLINIC | Age: 39
End: 2024-04-25
Attending: OPHTHALMOLOGY
Payer: COMMERCIAL

## 2024-04-25 DIAGNOSIS — H01.01A ULCERATIVE BLEPHARITIS OF BOTH UPPER AND LOWER EYELID OF RIGHT EYE: Primary | ICD-10-CM

## 2024-04-25 DIAGNOSIS — B02.30 HERPES ZOSTER OPHTHALMICUS OF RIGHT EYE: ICD-10-CM

## 2024-04-25 PROCEDURE — 99213 OFFICE O/P EST LOW 20 MIN: CPT | Mod: GC | Performed by: OPHTHALMOLOGY

## 2024-04-25 PROCEDURE — 99213 OFFICE O/P EST LOW 20 MIN: CPT

## 2024-04-25 RX ORDER — NEOMYCIN SULFATE, POLYMYXIN B SULFATE, AND DEXAMETHASONE 3.5; 10000; 1 MG/G; [USP'U]/G; MG/G
0.5 OINTMENT OPHTHALMIC 4 TIMES DAILY
Qty: 3.5 G | Refills: 1 | Status: SHIPPED | OUTPATIENT
Start: 2024-04-25 | End: 2024-05-23

## 2024-04-25 RX ORDER — VALACYCLOVIR HYDROCHLORIDE 1 G/1
1000 TABLET, FILM COATED ORAL DAILY
Qty: 90 TABLET | Refills: 1 | Status: SHIPPED | OUTPATIENT
Start: 2024-04-25 | End: 2024-10-22

## 2024-04-25 ASSESSMENT — VISUAL ACUITY
OS_SC: 20/20
OD_SC: 20/20
METHOD: SNELLEN - LINEAR

## 2024-04-25 ASSESSMENT — CONF VISUAL FIELD
OD_INFERIOR_TEMPORAL_RESTRICTION: 0
OS_INFERIOR_NASAL_RESTRICTION: 0
OS_SUPERIOR_NASAL_RESTRICTION: 0
OD_INFERIOR_NASAL_RESTRICTION: 0
OS_NORMAL: 1
OS_INFERIOR_TEMPORAL_RESTRICTION: 0
OD_SUPERIOR_TEMPORAL_RESTRICTION: 3
OD_SUPERIOR_NASAL_RESTRICTION: 0
OS_SUPERIOR_TEMPORAL_RESTRICTION: 0
METHOD: COUNTING FINGERS

## 2024-04-25 ASSESSMENT — SLIT LAMP EXAM - LIDS: COMMENTS: MGD

## 2024-04-25 ASSESSMENT — TONOMETRY
IOP_METHOD: ICARE
OD_IOP_MMHG: 15
OS_IOP_MMHG: 17

## 2024-04-25 ASSESSMENT — EXTERNAL EXAM - LEFT EYE: OS_EXAM: NORMAL

## 2024-04-25 NOTE — NURSING NOTE
Chief Complaints and History of Present Illnesses   Patient presents with    Follow Up     1 week follow up Herpes Zoster Ophthalmicus, right sided     Chief Complaint(s) and History of Present Illness(es)       Follow Up              Comments: 1 week follow up Herpes Zoster Ophthalmicus, right sided              Comments    Pt feels that she is unable to open her RE since last visit. Pt feeling more irritation around RE. Constant tearing from RE since last week.   No new flashes or floaters.     FILOMENA Blood April 25, 2024 7:56 AM

## 2024-04-25 NOTE — PATIENT INSTRUCTIONS
STOP erythromycin ointment  START maxitrol 3x/day in the right eyelids    START prednisolone (pink cap drop) taper: 3x/day for 5 days, then 2x/day for 5 days, then daily for 5 days, then stop    Continue artificial tears 4x/day    Continue Valtrex 1g daily for next 6 months    Follow up with primary care doctor for pain management

## 2024-04-25 NOTE — PROGRESS NOTES
Ophthalmology Acute Clinic     HPI       Follow Up     Additional comments: 1 week follow up Herpes Zoster Ophthalmicus, right sided             Comments    Pt feels that she is unable to open her RE since last visit. Pt feeling more irritation around RE. Constant tearing from RE since last week.   No new flashes or floaters.     FILOMENA Blood April 25, 2024 7:56 AM                Last edited by Mihai Johnson COMT on 4/25/2024  7:56 AM.          HPI:   Keyla Garcia is a 38 year old female who presents for follow up of possible HZO right eye.     4/6 had right sided 10/10 stabbing headache with questionable early rash. Had blepharitis and concern for infiltrate suspicious for staph marginal started on maxitrol QID, AT, and lid hygiene.    Seen 4/10 with rash eruption, started on valtrex 1g TID and prednisone 20mg BID. Symptoms are mainly severe right sided facial pain, though no ocular pain. No vision change; no new flashes, floaters, or curtain down visual field.    4/25: Presents for follow up. Was improving but the things worsened Sunday. Worsened light sensitivity and tearing. Experiencing pain above the eyebrows. She took her last dose of valtrex TID last night. Was using erythromycin ointment but stopped a few days ago. Still taking prednisolone QID in the right eye.    Past Ocular history:   - Contact lens wear: No  - Ocular Surgical History: No  - Current Eye drops: Maxitrol TID right eye     PMHx: None    Review of systems for the eyes was negative other than the pertinent positives/negatives listed in the HPI.      Assessment & Plan      Keyla Garcia is a 38 year old female with the following diagnoses:     #Herpes Zoster Ophthalmicus, right sided  #Anterior uveitis, right eye    - 38 year old otherwise healthy female who presented with HZO right eye, initial symptoms were right sided severe headache/face pain 4/6/24 with eruption of rash 4/10/24 and she was started on valtrex 1g TID  and prednisone 20 mg.   - 4/25: Subjectively worse with light sensitivity, tearing, and pain felt above brow. Exam reassuring with no corneal dendrites or defect and no cell in AC. Pain likely due to post-herpetic neuralgia, currently only taking 100mg gabapentin daily    Plan:  - Continue PO valtrex 1g daily for 6 months for prophylaxis  - Stop erythromycin ointment  - Start prednisolone taper: 3x/day for 5 days, then 2x/day for 5 days, then daily for 5 days, then stop  - Defer pain management to PCP: would likely benefit from increased gabapentin    #MGD of upper and lower lids of both eyes  #Blepharitis both eyes  May be contributing to light sensitivity and pain.    Plan:  - warm compresses BID OU x 5-10 min each time  - Start Maxitrol ointment TID, right eye  - PFATs QID and prn OU    Patient disposition:   Return in about 4 weeks (around 5/23/2024), or if symptoms worsen or fail to improve, for general clinic.    Patient seen with Dr. Te Pitts MD  Resident Physician, PGY-2  Department of Ophthalmology    Attending Physician Attestation:  Complete documentation of historical and exam elements from today's encounter can be found in the full encounter summary report (not reduplicated in this progress note).  I personally obtained the chief complaint(s) and history of present illness.  I confirmed and edited as necessary the review of systems, past medical/surgical history, family history, social history, and examination findings as documented by others; and I examined the patient myself.  I personally reviewed the relevant tests, images, and reports as documented above.  I formulated and edited as necessary the assessment and plan and discussed the findings and management plan with the patient and family. . - Crow Tiwari MD

## 2024-05-07 ENCOUNTER — MYC MEDICAL ADVICE (OUTPATIENT)
Dept: FAMILY MEDICINE | Facility: CLINIC | Age: 39
End: 2024-05-07
Payer: COMMERCIAL

## 2024-05-07 DIAGNOSIS — B02.30 HERPES ZOSTER WITH OPHTHALMIC COMPLICATION, UNSPECIFIED HERPES ZOSTER EYE DISEASE: ICD-10-CM

## 2024-05-07 NOTE — LETTER
May 9, 2024      Keyla Garcia  [id number] [facility] [id number]        To Whom It May Concern:    Keyla Garcia  was seen on 4/19/2024.  Please excuse her until 5/17/2024 due to illness. Please call the clinic with any questions.         Sincerely,    SUSY SHARP, DO

## 2024-05-08 NOTE — TELEPHONE ENCOUNTER
Please see my chart message.       Requesting work note and possibly eye medication.     Delia Hugo RN on 5/8/2024 at 10:26 AM

## 2024-05-09 RX ORDER — GABAPENTIN 100 MG/1
200 CAPSULE ORAL 3 TIMES DAILY
Qty: 180 CAPSULE | Refills: 1 | Status: SHIPPED | OUTPATIENT
Start: 2024-05-09 | End: 2024-05-15

## 2024-05-09 NOTE — TELEPHONE ENCOUNTER
Called and spoke with Keyla and her .  Plan to increase her gabapentin to help with pain control.  Work note provided.    SUSY SHARP, DO

## 2024-05-15 ENCOUNTER — VIRTUAL VISIT (OUTPATIENT)
Dept: FAMILY MEDICINE | Facility: CLINIC | Age: 39
End: 2024-05-15
Payer: COMMERCIAL

## 2024-05-15 DIAGNOSIS — B02.30 HERPES ZOSTER WITH OPHTHALMIC COMPLICATION, UNSPECIFIED HERPES ZOSTER EYE DISEASE: Primary | ICD-10-CM

## 2024-05-15 PROCEDURE — 99214 OFFICE O/P EST MOD 30 MIN: CPT | Mod: 95 | Performed by: FAMILY MEDICINE

## 2024-05-15 RX ORDER — GABAPENTIN 300 MG/1
300 CAPSULE ORAL 3 TIMES DAILY
Qty: 90 CAPSULE | Refills: 2 | Status: SHIPPED | OUTPATIENT
Start: 2024-05-15

## 2024-05-15 RX ORDER — OXYCODONE HYDROCHLORIDE 5 MG/1
5 TABLET ORAL EVERY 6 HOURS PRN
Qty: 12 TABLET | Refills: 0 | Status: SHIPPED | OUTPATIENT
Start: 2024-05-15 | End: 2024-05-18

## 2024-05-15 RX ORDER — PREDNISONE 20 MG/1
TABLET ORAL
Qty: 20 TABLET | Refills: 0 | Status: SHIPPED | OUTPATIENT
Start: 2024-05-15

## 2024-05-15 NOTE — PROGRESS NOTES
"Keyla is a 38 year old who is being evaluated via a billable video visit.    How would you like to obtain your AVS? MyChart  If the video visit is dropped, the invitation should be resent by: Text to cell phone: [phone]  Will anyone else be joining your video visit? Yes: . How would they like to receive their invitation? Text to cell phone: [phone]      Assessment & Plan     Herpes zoster with ophthalmic complication, unspecified herpes zoster eye disease  Increase gabapentin to 300 mg 3 times daily.  Trial of longer taper of prednisone.  Oxycodone for severe breakthrough pain.  She has appointment with ophthalmology next week.  If pain is worsening in the interim recommend reevaluation in ER  - oxyCODONE (ROXICODONE) 5 MG tablet  Dispense: 12 tablet; Refill: 0  - gabapentin (NEURONTIN) 300 MG capsule  Dispense: 90 capsule; Refill: 2  - predniSONE (DELTASONE) 20 MG tablet  Dispense: 20 tablet; Refill: 0            BMI  Estimated body mass index is 29.59 kg/m  as calculated from the following:    Height as of 4/10/24: 1.575 m (5' 2\").    Weight as of 4/19/24: 73.4 kg (161 lb 12.8 oz).             Subjective   Keyla is a 38 year old, presenting for the following health issues:  Shingles        5/15/2024     5:23 PM   Additional Questions   Roomed by Mary BABIN MA   Accompanied by Self     History of Present Illness       Reason for visit:  Shingles pain    She eats 2-3 servings of fruits and vegetables daily.She consumes 0 sweetened beverage(s) daily.She exercises with enough effort to increase her heart rate 10 to 19 minutes per day.  She exercises with enough effort to increase her heart rate 3 or less days per week.   She is taking medications regularly.     Continued pain so severe she was unable to sleep last night.  Wondering if there is any other options.  She is increase her gabapentin to 300 mg at bedtime      Review of Systems  Constitutional, neuro, ENT, endocrine, pulmonary, cardiac, " gastrointestinal, genitourinary, musculoskeletal, integument and psychiatric systems are negative, except as otherwise noted.      Objective           Vitals:  No vitals were obtained today due to virtual visit.    Physical Exam   GENERAL: alert and no distress  EYES: Eyes grossly normal to inspection.  No discharge or erythema, or obvious scleral/conjunctival abnormalities.  RESP: No audible wheeze, cough, or visible cyanosis.    SKIN: Visible skin clear. No significant rash, abnormal pigmentation or lesions.  NEURO: Cranial nerves grossly intact.  Mentation and speech appropriate for age.  PSYCH: Appropriate affect, tone, and pace of words        Video-Visit Details    Type of service:  Video Visit   Originating Location (pt. Location): Home    Distant Location (provider location):  On-site  Platform used for Video Visit: Darin  Signed Electronically by: SUSY SHARP DO

## 2024-05-18 ENCOUNTER — HEALTH MAINTENANCE LETTER (OUTPATIENT)
Age: 39
End: 2024-05-18

## 2024-05-23 ENCOUNTER — OFFICE VISIT (OUTPATIENT)
Dept: OPHTHALMOLOGY | Facility: CLINIC | Age: 39
End: 2024-05-23
Attending: OPHTHALMOLOGY
Payer: COMMERCIAL

## 2024-05-23 DIAGNOSIS — H01.01A ULCERATIVE BLEPHARITIS OF BOTH UPPER AND LOWER EYELID OF RIGHT EYE: ICD-10-CM

## 2024-05-23 DIAGNOSIS — B02.30 HERPES ZOSTER OPHTHALMICUS OF RIGHT EYE: Primary | ICD-10-CM

## 2024-05-23 PROCEDURE — 99214 OFFICE O/P EST MOD 30 MIN: CPT | Performed by: OPHTHALMOLOGY

## 2024-05-23 PROCEDURE — 99213 OFFICE O/P EST LOW 20 MIN: CPT | Performed by: OPHTHALMOLOGY

## 2024-05-23 RX ORDER — DOXYCYCLINE HYCLATE 50 MG/1
50 CAPSULE ORAL DAILY
Qty: 90 CAPSULE | Refills: 1 | Status: SHIPPED | OUTPATIENT
Start: 2024-05-23

## 2024-05-23 RX ORDER — NEOMYCIN SULFATE, POLYMYXIN B SULFATE, AND DEXAMETHASONE 3.5; 10000; 1 MG/G; [USP'U]/G; MG/G
0.5 OINTMENT OPHTHALMIC 4 TIMES DAILY
Qty: 3.5 G | Refills: 1 | Status: SHIPPED | OUTPATIENT
Start: 2024-05-23

## 2024-05-23 ASSESSMENT — CONF VISUAL FIELD
OS_INFERIOR_TEMPORAL_RESTRICTION: 0
OS_NORMAL: 1
OD_SUPERIOR_NASAL_RESTRICTION: 0
OS_SUPERIOR_TEMPORAL_RESTRICTION: 0
OS_SUPERIOR_NASAL_RESTRICTION: 0
OD_INFERIOR_TEMPORAL_RESTRICTION: 0
METHOD: COUNTING FINGERS
OD_SUPERIOR_TEMPORAL_RESTRICTION: 0
OS_INFERIOR_NASAL_RESTRICTION: 0
OD_NORMAL: 1
OD_INFERIOR_NASAL_RESTRICTION: 0

## 2024-05-23 ASSESSMENT — TONOMETRY
IOP_METHOD: TONOPEN
OS_IOP_MMHG: 17
OD_IOP_MMHG: 18

## 2024-05-23 ASSESSMENT — EXTERNAL EXAM - LEFT EYE: OS_EXAM: NORMAL

## 2024-05-23 ASSESSMENT — VISUAL ACUITY
OD_SC: 20/20
METHOD: SNELLEN - LINEAR
OS_SC: 20/20

## 2024-05-23 NOTE — PROGRESS NOTES
HPI       Blepharitis Follow Up    In right eye (Feels the va is better  ).  Associated symptoms include eye pain (has decreaseed), redness of lids and tearing.  Negative for discharge.  Duration of months.  Pain was noted as 0/10.             Herpes Zoster Follow Up    Associated symptoms include eye pain (has decreaseed) and tearing.  Negative for discharge.             Comments    Here for Herpes zoster ophthalmicus of right eye and ulcerative blepharitis of both upper and lower eyelid of right eye  AT   Santi QID   Prednisone 1 tab.  Started started 5/15 with a taper  Luba Trujillo COT 7:45 AM May 23, 2024            Last edited by Luba Trujillo on 5/23/2024  7:47 AM.          Review of systems for the eyes was negative other than the pertinent positives/negatives listed in the HPI.      Assessment & Plan      Keyla Garcia is a 38 year old female with the following diagnoses:   1. Herpes zoster ophthalmicus of right eye    2. 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         Recovering from HZO right eye initially presenting June 2024  Blepharitis with possible mild keratouveitis  Now completed taper off prednisolone   Given oral course of prednisone by primary care physician starting 5/15 and tapering  Continues on neurontin    Still with blepharitis contributing to symptoms  Discussed contributing factors   Continue maxitrol santi four times a day to eyelids  Continue Artificial tears four times a day  and as needed   Resume warm compresses 2-4x daily   Continue lid hygiene daily   Start doxycycline 50 mg orally daily   Pain management per primary care physician     Will refer to Dr. Quezada for ongoing cares closer to home    Patient disposition:   Return in about 6 weeks (around 7/4/2024) for VT only with Dr. Quezada.           Attending Physician Attestation:  Complete documentation of historical and exam elements from today's encounter can be found in the full encounter  summary report (not reduplicated in this progress note).  I personally obtained the chief complaint(s) and history of present illness.  I confirmed and edited as necessary the review of systems, past medical/surgical history, family history, social history, and examination findings as documented by others; and I examined the patient myself.  I personally reviewed the relevant tests, images, and reports as documented above.  I formulated and edited as necessary the assessment and plan and discussed the findings and management plan with the patient and family. . - Crow Tiwari MD

## 2024-05-23 NOTE — NURSING NOTE
Chief Complaints and History of Present Illnesses   Patient presents with    Blepharitis Follow Up    Herpes Zoster Follow Up     Chief Complaint(s) and History of Present Illness(es)       Blepharitis Follow Up              Laterality: right eye (Feels the va is better  )    Associated signs and symptoms: eye pain (has decreaseed), redness of lids and tearing.  Negative for discharge    Duration: months    Pain scale: 0/10              Herpes Zoster Follow Up              Associated symptoms: eye pain (has decreaseed) and tearing.  Negative for discharge              Comments    Here for Herpes zoster ophthalmicus of right eye and ulcerative blepharitis of both upper and lower eyelid of right eye  AT   Jessica QID   Prednisone 1 tab.  Started started 5/15 with a taper  Luba Trujillo COT 7:45 AM May 23, 2024

## 2024-05-23 NOTE — PATIENT INSTRUCTIONS
Start doxycycline 50 mg orally     Continue maxitrol 3-4x/day in the right eyelids    Resume warm compresses four times a day x 5 minutes    Continue artificial tears 4x/day as needed     Continue Valtrex 1g orally daily

## 2024-09-30 NOTE — ED TRIAGE NOTES
BIBA from home with complaints of 10/10 headache. Was seen at another hospital a few days ago with the same complaints and diagnosed with a sinus infection. Continues to have headaches despite treatment. In route received Toradol, Morphine and Zofran with improvement to pain being a 1/10. Denies history of migraines or recent trauma. EMS noted patient to be hpertesenive at 170's-190's systolic.           30-Sep-2024 13:50

## 2025-06-08 ENCOUNTER — HEALTH MAINTENANCE LETTER (OUTPATIENT)
Age: 40
End: 2025-06-08